# Patient Record
Sex: FEMALE | Race: WHITE | NOT HISPANIC OR LATINO | Employment: OTHER | URBAN - METROPOLITAN AREA
[De-identification: names, ages, dates, MRNs, and addresses within clinical notes are randomized per-mention and may not be internally consistent; named-entity substitution may affect disease eponyms.]

---

## 2023-10-01 LAB — URINE CULTURE: NORMAL

## 2023-11-20 ENCOUNTER — PROCEDURE VISIT (OUTPATIENT)
Dept: PODIATRY | Facility: CLINIC | Age: 63
End: 2023-11-20
Payer: COMMERCIAL

## 2023-11-20 DIAGNOSIS — M21.371 RIGHT FOOT DROP: Primary | ICD-10-CM

## 2023-11-20 DIAGNOSIS — G80.9 CEREBRAL PALSY, UNSPECIFIED TYPE (MULTI): ICD-10-CM

## 2023-11-20 DIAGNOSIS — B35.1 ONYCHOMYCOSIS: ICD-10-CM

## 2023-11-20 DIAGNOSIS — M79.674 PAIN IN TOES OF BOTH FEET: ICD-10-CM

## 2023-11-20 DIAGNOSIS — M79.675 PAIN IN TOES OF BOTH FEET: ICD-10-CM

## 2023-11-20 PROCEDURE — 99203 OFFICE O/P NEW LOW 30 MIN: CPT | Performed by: PODIATRIST

## 2023-11-20 RX ORDER — CA/D3/MAG OX/ZINC/COP/MANG/BOR 600 MG-800
1 TABLET,CHEWABLE ORAL DAILY
COMMUNITY

## 2023-11-20 RX ORDER — SERTRALINE HYDROCHLORIDE 100 MG/1
100 TABLET, FILM COATED ORAL DAILY
COMMUNITY
Start: 2020-02-07

## 2023-11-20 RX ORDER — ETANERCEPT 50 MG/ML
50 SOLUTION SUBCUTANEOUS SEE ADMIN INSTRUCTIONS
COMMUNITY
Start: 2015-02-09

## 2023-11-20 RX ORDER — CLONAZEPAM 0.5 MG/1
0.5 TABLET ORAL DAILY
COMMUNITY
Start: 2018-12-03

## 2023-11-20 RX ORDER — ROSUVASTATIN CALCIUM 5 MG/1
5 TABLET, COATED ORAL DAILY
COMMUNITY
Start: 2023-06-30

## 2023-11-20 RX ORDER — ACETAMINOPHEN 500 MG
500 TABLET ORAL EVERY 8 HOURS PRN
COMMUNITY
Start: 2021-06-04

## 2023-11-20 RX ORDER — LOSARTAN POTASSIUM 50 MG/1
50 TABLET ORAL
COMMUNITY
End: 2024-05-06 | Stop reason: WASHOUT

## 2023-11-20 RX ORDER — TRAZODONE HYDROCHLORIDE 50 MG/1
50 TABLET ORAL NIGHTLY
COMMUNITY
Start: 2018-12-18

## 2023-11-20 RX ORDER — GABAPENTIN 100 MG/1
100 CAPSULE ORAL SEE ADMIN INSTRUCTIONS
COMMUNITY
Start: 2019-06-04

## 2023-11-20 NOTE — PROGRESS NOTES
History Of Present Illness  Enid Sheffield is a 63 y.o. female presenting for transition of care. Patient complains with painful elongated nails.  Patient also Of Her Right Lower Extremity and Does Not Feel like She Has Strength in her right leg.  She Feels like It Gives out on Her.     Past Medical History  She has a past medical history of Encounter for immunization (03/31/2017), Encounter for screening for malignant neoplasm of cervix (06/11/2016), Generalized hyperhidrosis (08/10/2015), Impacted cerumen, bilateral (03/02/2022), Laceration without foreign body of unspecified eyelid and periocular area, initial encounter (06/11/2016), Personal history of other diseases of the circulatory system, Personal history of other diseases of the digestive system, Personal history of other diseases of the respiratory system (10/03/2014), and Personal history of other diseases of urinary system.    Surgical History  She has a past surgical history that includes Lumbar discectomy (09/06/2013); Cervical fusion (09/06/2013); Other surgical history (01/20/2021); and Other surgical history (03/31/2014).     Social History  She has no history on file for tobacco use, alcohol use, and drug use.    Family History  No family history on file.     Allergies  Penicillins and Sulfa (sulfonamide antibiotics)    Medications  Current Outpatient Medications   Medication Sig Dispense Refill    acetaminophen (Tylenol Extra Strength) 500 mg tablet 1 tablet (500 mg) every 8 hours if needed.      Ca-D3-mag-zinc--lisandra-boron (Caltrate 600-D Plus Minerals) 600 mg calcium- 800 unit-40 mg tablet,chewable Chew 1 tablet once daily.      clonazePAM (KlonoPIN) 0.5 mg tablet Take 1 tablet (0.5 mg) by mouth once daily.      EnbreL SureClick 50 mg/mL (1 mL) pen injector pen injection Inject 1 mL (50 mg) under the skin see administration instructions. Every 10 days      gabapentin (Neurontin) 100 mg capsule Take 1 capsule (100 mg) by mouth see  administration instructions. As needed      losartan (Cozaar) 50 mg tablet Take 1 tablet (50 mg) by mouth once daily.      rosuvastatin (Crestor) 5 mg tablet Take 1 tablet (5 mg) by mouth once daily.      sertraline (Zoloft) 100 mg tablet Take 1 tablet (100 mg) by mouth once daily.      SODIUM BORATE, BULK, MISC Take 1 tablet by mouth 2 times a day.      traZODone (Desyrel) 50 mg tablet Take 1 tablet (50 mg) by mouth once daily at bedtime.       No current facility-administered medications for this visit.       Review of Systems    REVIEW OF SYSTEMS  GENERAL:  Negative for malaise, significant weight loss, fever  CARDIOVASCULAR: leg swelling   MUSCULOSKELETAL:  Negative for joint pain or swelling, back pain, and muscle pain.  SKIN:  Negative for lesions, rash, and itching  PSYCH:  Negative for sleep disturbance, mood disorder and recent psychosocial stressors  NEURO: Negative, denies any burning, tingling or numbness     Objective:   Vasc: DP and PT pulses are palpable bilateral.  CFT is less than 3 seconds bilateral.  Skin temperature is warm to cool proximal to distal bilateral.      Neuro:  Light touch is intact to the foot bilateral.      Derm: Nails 1-5 bilateral are thickened, elongated and crumbly with subungual debris. Skin is supple with normal texture and turgor noted.  Webspaces are clean, dry and intact bilateral.  There are no hyperkeratoses, ulcerations, verruca or other lesions noted.      Ortho; patient has weakness of her right lower extremity.  Patient walks with a slight limp.  Patient does not have a propulsive gait on her right foot.    Assessment/Plan     Diagnoses and all orders for this visit:  Right foot drop  Cerebral palsy, unspecified type (CMS/HCC)  Pain in toes of both feet  Onychomycosis      Toenails are debrided in length and thickness to avoid infection and for pain relief  Feel best option for this patient is to get a brace made.  Patient would benefit from an AFO.  This would help  her perform ADLs with more safety and less fear of falling.  She would also have less pain with ambulation.  This would be a lifetime use product.           Felisa Healy, [facility]

## 2024-05-06 ENCOUNTER — OFFICE VISIT (OUTPATIENT)
Dept: PODIATRY | Facility: CLINIC | Age: 64
End: 2024-05-06
Payer: COMMERCIAL

## 2024-05-06 DIAGNOSIS — B35.1 ONYCHOMYCOSIS: ICD-10-CM

## 2024-05-06 DIAGNOSIS — G80.9 CEREBRAL PALSY, UNSPECIFIED TYPE (MULTI): ICD-10-CM

## 2024-05-06 DIAGNOSIS — M79.674 PAIN IN TOES OF BOTH FEET: Primary | ICD-10-CM

## 2024-05-06 DIAGNOSIS — M79.675 PAIN IN TOES OF BOTH FEET: Primary | ICD-10-CM

## 2024-05-06 DIAGNOSIS — M21.371 RIGHT FOOT DROP: ICD-10-CM

## 2024-05-06 PROCEDURE — 99213 OFFICE O/P EST LOW 20 MIN: CPT | Performed by: PODIATRIST

## 2024-05-06 RX ORDER — DOXYCYCLINE 100 MG/1
100 CAPSULE ORAL 2 TIMES DAILY
Qty: 6 CAPSULE | Refills: 0 | Status: SHIPPED | OUTPATIENT
Start: 2024-05-06 | End: 2024-05-09

## 2024-05-06 NOTE — PROGRESS NOTES
History Of Present Illness  Enid Sheffield is a 63 y.o. female presenting with chief complaint of: deformed toenail of left 4th & 5th toenails. PCP Dr Collazo, last visit 5/2024  Patient would like permanent nail avulsion of toes 4 and 5 left foot.  She is finding very painful.  She has had this done on her right foot.  She states with the AFO she has not fallen since she received the device.  Past Medical History  She has a past medical history of Encounter for immunization (03/31/2017), Encounter for screening for malignant neoplasm of cervix (06/11/2016), Generalized hyperhidrosis (08/10/2015), Impacted cerumen, bilateral (03/02/2022), Laceration without foreign body of unspecified eyelid and periocular area, initial encounter (06/11/2016), Personal history of other diseases of the circulatory system, Personal history of other diseases of the digestive system, Personal history of other diseases of the respiratory system (10/03/2014), and Personal history of other diseases of urinary system.    Surgical History  She has a past surgical history that includes Lumbar discectomy (09/06/2013); Cervical fusion (09/06/2013); Other surgical history (01/20/2021); and Other surgical history (03/31/2014).     Social History  She has no history on file for tobacco use, alcohol use, and drug use.    Family History  No family history on file.     Allergies  Penicillins and Sulfa (sulfonamide antibiotics)    Medications  Current Outpatient Medications   Medication Sig Dispense Refill    acetaminophen (Tylenol Extra Strength) 500 mg tablet 1 tablet (500 mg) every 8 hours if needed.      Ca-D3-mag-zinc--lisandra-boron (Caltrate 600-D Plus Minerals) 600 mg calcium- 800 unit-40 mg tablet,chewable Chew 1 tablet once daily.      clonazePAM (KlonoPIN) 0.5 mg tablet Take 1 tablet (0.5 mg) by mouth once daily.      EnbreL SureClick 50 mg/mL (1 mL) pen injector pen injection Inject 1 mL (50 mg) under the skin see administration  instructions. Every 10 days      gabapentin (Neurontin) 100 mg capsule Take 1 capsule (100 mg) by mouth see administration instructions. As needed      rosuvastatin (Crestor) 5 mg tablet Take 1 tablet (5 mg) by mouth once daily.      sertraline (Zoloft) 100 mg tablet Take 1 tablet (100 mg) by mouth once daily.      SODIUM BORATE, BULK, MISC Take 1 tablet by mouth 2 times a day.      traZODone (Desyrel) 50 mg tablet Take 1 tablet (50 mg) by mouth once daily at bedtime.      losartan (Cozaar) 50 mg tablet Take 1 tablet (50 mg) by mouth once daily.       No current facility-administered medications for this visit.       Review of Systems    REVIEW OF SYSTEMS  GENERAL:  Negative for malaise, significant weight loss, fever  CARDIOVASCULAR: leg swelling   MUSCULOSKELETAL:  Negative for joint pain or swelling, back pain, and muscle pain.  SKIN:  Negative for lesions, rash, and itching  PSYCH:  Negative for sleep disturbance, mood disorder and recent psychosocial stressors  NEURO: Negative, denies any burning, tingling or numbness     Objective:   Vasc: DP and PT pulses are palpable bilateral.  CFT is less than 3 seconds bilateral.  Skin temperature is warm to cool proximal to distal bilateral.      Neuro:  Light touch is intact to the foot bilateral. .  The hallux is downgoing bilateral.      Derm patient's nails are thickened elongated and crumbly on the left toes 4 and 5  Skin is supple with normal texture and turgor noted.  Webspaces are clean, dry and intact bilateral.  There are no hyperkeratoses, ulcerations, verruca or other lesions noted.      Ortho: Patient has spasm secondary to CP.  She has foot drop on the right.  Assessment/Plan     Diagnoses and all orders for this visit:  Pain in toes of both feet  Onychomycosis  Right foot drop  Cerebral palsy, unspecified type (Multi)      Patient can continue to wear AFO.  This should help prevent falling.  We did discuss permanent removal of toenails 4 and 5 on the left  foot.  Benefits and risks of surgery were explained to patient prophylactic antibiotics have been called in.           Felisa Healy, CMA

## 2024-05-23 ENCOUNTER — APPOINTMENT (OUTPATIENT)
Dept: PODIATRY | Facility: CLINIC | Age: 64
End: 2024-05-23
Payer: COMMERCIAL

## 2024-05-30 ENCOUNTER — TELEPHONE (OUTPATIENT)
Dept: PODIATRY | Facility: CLINIC | Age: 64
End: 2024-05-30
Payer: COMMERCIAL

## 2024-05-30 DIAGNOSIS — L60.0 INGROWN TOENAIL: Primary | ICD-10-CM

## 2024-05-30 RX ORDER — DOXYCYCLINE 100 MG/1
100 CAPSULE ORAL 2 TIMES DAILY
Qty: 10 CAPSULE | Refills: 0 | Status: SHIPPED | OUTPATIENT
Start: 2024-05-30 | End: 2024-06-04

## 2024-05-30 NOTE — TELEPHONE ENCOUNTER
Enid asked that you send the antibiotic in again for her procedure on Monday.  She missed the  the last time it was sent in for her.

## 2024-06-03 ENCOUNTER — PROCEDURE VISIT (OUTPATIENT)
Dept: PODIATRY | Facility: CLINIC | Age: 64
End: 2024-06-03
Payer: COMMERCIAL

## 2024-06-03 DIAGNOSIS — L60.0 INGROWN TOENAIL: ICD-10-CM

## 2024-06-03 DIAGNOSIS — M79.674 PAIN IN TOES OF BOTH FEET: Primary | ICD-10-CM

## 2024-06-03 DIAGNOSIS — M79.675 PAIN IN TOES OF BOTH FEET: Primary | ICD-10-CM

## 2024-06-03 DIAGNOSIS — M21.371 RIGHT FOOT DROP: ICD-10-CM

## 2024-06-03 DIAGNOSIS — B35.1 ONYCHOMYCOSIS: ICD-10-CM

## 2024-06-03 DIAGNOSIS — G80.9 CEREBRAL PALSY, UNSPECIFIED TYPE (MULTI): ICD-10-CM

## 2024-06-03 PROCEDURE — 11750 EXCISION NAIL&NAIL MATRIX: CPT | Performed by: PODIATRIST

## 2024-06-03 NOTE — PROGRESS NOTES
History Of Present Illness  Enid Sheffield is a 63 y.o. female presenting with chief complaint of: deformed toenail of left 4th & 5th toenails.   PCP Dr Collazo, last visit 5/2024  Patient would like permanent nail avulsion of toes 4 and 5 left foot.  She is finding very painful.  She has had this done on her right foot.  She states with the AFO she has not fallen since she received the device.  Past Medical History  She has a past medical history of Encounter for immunization (03/31/2017), Encounter for screening for malignant neoplasm of cervix (06/11/2016), Generalized hyperhidrosis (08/10/2015), Impacted cerumen, bilateral (03/02/2022), Laceration without foreign body of unspecified eyelid and periocular area, initial encounter (06/11/2016), Personal history of other diseases of the circulatory system, Personal history of other diseases of the digestive system, Personal history of other diseases of the respiratory system (10/03/2014), and Personal history of other diseases of urinary system.    Surgical History  She has a past surgical history that includes Lumbar discectomy (09/06/2013); Cervical fusion (09/06/2013); Other surgical history (01/20/2021); and Other surgical history (03/31/2014).     Social History  She has no history on file for tobacco use, alcohol use, and drug use.    Family History  No family history on file.     Allergies  Penicillins and Sulfa (sulfonamide antibiotics)    Medications  Current Outpatient Medications   Medication Sig Dispense Refill    acetaminophen (Tylenol Extra Strength) 500 mg tablet 1 tablet (500 mg) every 8 hours if needed.      Ca-D3-mag-zinc--lisandra-boron (Caltrate 600-D Plus Minerals) 600 mg calcium- 800 unit-40 mg tablet,chewable Chew 1 tablet once daily.      clonazePAM (KlonoPIN) 0.5 mg tablet Take 1 tablet (0.5 mg) by mouth once daily.      doxycycline (Vibramycin) 100 mg capsule Take 1 capsule (100 mg) by mouth 2 times a day for 5 days. Take with at least 8  ounces (large glass) of water, do not lie down for 30 minutes after 10 capsule 0    EnbreL SureClick 50 mg/mL (1 mL) pen injector pen injection Inject 1 mL (50 mg) under the skin see administration instructions. Every 10 days      gabapentin (Neurontin) 100 mg capsule Take 1 capsule (100 mg) by mouth see administration instructions. As needed      rosuvastatin (Crestor) 5 mg tablet Take 1 tablet (5 mg) by mouth once daily.      sertraline (Zoloft) 100 mg tablet Take 1 tablet (100 mg) by mouth once daily.      SODIUM BORATE, BULK, MISC Take 1 tablet by mouth 2 times a day.      traZODone (Desyrel) 50 mg tablet Take 1 tablet (50 mg) by mouth once daily at bedtime.       No current facility-administered medications for this visit.       Review of Systems    REVIEW OF SYSTEMS  GENERAL:  Negative for malaise, significant weight loss, fever  CARDIOVASCULAR: leg swelling   MUSCULOSKELETAL:  Negative for joint pain or swelling, back pain, and muscle pain.  SKIN:  Negative for lesions, rash, and itching  PSYCH:  Negative for sleep disturbance, mood disorder and recent psychosocial stressors  NEURO: Negative, denies any burning, tingling or numbness     Objective:   Vasc: DP and PT pulses are palpable bilateral.  CFT is less than 3 seconds bilateral.  Skin temperature is warm to cool proximal to distal bilateral.      Neuro:  Light touch is intact to the foot bilateral. .  The hallux is downgoing bilateral.      Derm patient's nails are thickened elongated and crumbly on the left toes 4 and 5  Skin is supple with normal texture and turgor noted.  Webspaces are clean, dry and intact bilateral.  There are no hyperkeratoses, ulcerations, verruca or other lesions noted.      Ortho: Patient has spasm secondary to CP.  She has foot drop on the right.  Assessment/Plan Ingrown toenails 4/5 left foot with fungus    Patient has consented to a permanent partial nail avulsion both verbally and written. Patient understands that the  toenail may grow back and could appear discolored, thickened, or with a fungal infection. Patient appears to understand and is in agreement with the plan.  All questions were answered to the patient's satisfaction with no guarantees given or implied.      Nail Removal Informed Consent included the following and was signed by patient or patients guardian:   I understand that this procedure involves injection of local anesthesia, and I have no known allergies to local anesthetics.  My physician and I have discussed risks, benefits, and potential complications of this procedure.  Risks include, but are not limited to: persistent bleeding, pain, prolonged healing, infection, allergic reaction, swelling, numbness/tingling, and recurrence.  Alternative treatment options were also discussed.  All questions have been answered to my satisfaction and no guarantees regarding the outcome of the procedure have been given or implied.  Aftercare requirements have been discussed and I intend to comply with recommendation.      Procedure: Total Permanent Nail Avulsion -  Left 4 and 5 Toe    The digit was anesthetized with 3cc of 2% lidocaine plain utilizing a digit block. The area was sterily prepped and draped. The toe was cleansed with betadine. Digit tourniqauet applied. A spatula was used to separate the nail plate from the nail bed. An english anvil was used to separate the nail plate in a longitudinal fashion. The nail border was removed with hemostats. A curette was used to ensure no spicules remained.     Three separate applications of phenol were applied to the matrix cells for 30 seconds each. It was ensured that the skin was protected from the chemical. The area was rinsed thoroughly with alcohol to neutralize the acid.     Digit tourniquet removed. A curette was used again to ensure no spicules remained.  Appropriate capillary refill time was confirmed.  The toe was then dressed with antibiotic ointment and a dry sterile  dressing.  Patient was given extensive verbal and written homegoing instructions.      Homegoing instructions dispensed included:   The injection that you received prior to the procedure will have local anesthetic effects for the next 3-5 hours.  Following this, you may notice that the toe is sore with pressure.  Therefore, during healing, attempt to wear an open-toe or wider-toebox shoe.  Leave the bandage on your toe for the next 24 hours if possible.  Tomorrow, begin soaking the affected foot in a warm water bath, with or without Epsom salts.  Make sure to dry the toe completely before applying a dressing.   Change bandage on a daily basis beginning tomorrow following soaks.  Apply a small amount of topical antibiotic ointment with each dressing change.    Keep bandages on while you shower, and change bandage once you dry off.  Keep bandages on when you are wearing shoes and socks.  If a scab forms at the nail removal site, leave it in place.  Continue with daily soaking and bandage changes until you return in two weeks for your follow up appointment.  Call your physician immediately if you notice any increased redness, warmth, milky-appearing discharge, or other signs of infection at the nail removal site.  Call your physician immediately if you experience significant bleeding at the nail removal site.    Patient was advised to call the office or emergency advice line should they experience any problems, changes or worsening of symptoms, or have any questions. Alternatively, the patient was advised to go to the ED.    Pt instructed to follow up in 2-3 weeks or sooner if necessary.

## 2024-06-27 ENCOUNTER — OFFICE VISIT (OUTPATIENT)
Dept: PODIATRY | Facility: CLINIC | Age: 64
End: 2024-06-27
Payer: COMMERCIAL

## 2024-06-27 DIAGNOSIS — L03.032 CELLULITIS AND ABSCESS OF TOE OF LEFT FOOT: ICD-10-CM

## 2024-06-27 DIAGNOSIS — L02.612 CELLULITIS AND ABSCESS OF TOE OF LEFT FOOT: ICD-10-CM

## 2024-06-27 DIAGNOSIS — M79.675 PAIN IN TOES OF BOTH FEET: Primary | ICD-10-CM

## 2024-06-27 DIAGNOSIS — M79.674 PAIN IN TOES OF BOTH FEET: Primary | ICD-10-CM

## 2024-06-27 PROCEDURE — 87205 SMEAR GRAM STAIN: CPT

## 2024-06-27 PROCEDURE — 87070 CULTURE OTHR SPECIMN AEROBIC: CPT

## 2024-06-27 PROCEDURE — 87075 CULTR BACTERIA EXCEPT BLOOD: CPT

## 2024-06-27 PROCEDURE — 99213 OFFICE O/P EST LOW 20 MIN: CPT | Performed by: PODIATRIST

## 2024-06-27 PROCEDURE — 87186 SC STD MICRODIL/AGAR DIL: CPT

## 2024-06-27 RX ORDER — DOXYCYCLINE 100 MG/1
100 CAPSULE ORAL 2 TIMES DAILY
Qty: 14 CAPSULE | Refills: 0 | Status: SHIPPED | OUTPATIENT
Start: 2024-06-27 | End: 2024-07-04

## 2024-06-27 NOTE — PROGRESS NOTES
History Of Present Illness  Enid Sheffield is a 63 y.o. female presenting with chief complaint of: follow up from nail avulsion 6/3/24. Patient complains of pain with ambulation deformed toenail of left 4th & 5th toenails.   PCP Dr Collazo, last visit 5/2024  Patient would like permanent nail avulsion of toes 4 and 5 left foot.  She is finding very painful.  She has had this done on her right foot.  She states with the AFO she has not fallen since she received the device.  6/27: Patient presents with burning pain in her feet.  Most of her pain is located in her fourth toe.  Past Medical History  She has a past medical history of Encounter for immunization (03/31/2017), Encounter for screening for malignant neoplasm of cervix (06/11/2016), Generalized hyperhidrosis (08/10/2015), Impacted cerumen, bilateral (03/02/2022), Laceration without foreign body of unspecified eyelid and periocular area, initial encounter (06/11/2016), Personal history of other diseases of the circulatory system, Personal history of other diseases of the digestive system, Personal history of other diseases of the respiratory system (10/03/2014), and Personal history of other diseases of urinary system.    Surgical History  She has a past surgical history that includes Lumbar discectomy (09/06/2013); Cervical fusion (09/06/2013); Other surgical history (01/20/2021); and Other surgical history (03/31/2014).     Social History  She has no history on file for tobacco use, alcohol use, and drug use.    Family History  No family history on file.     Allergies  Penicillins and Sulfa (sulfonamide antibiotics)    Medications  Current Outpatient Medications   Medication Sig Dispense Refill    acetaminophen (Tylenol Extra Strength) 500 mg tablet 1 tablet (500 mg) every 8 hours if needed.      Ca-D3-mag-zinc--lisandra-boron (Caltrate 600-D Plus Minerals) 600 mg calcium- 800 unit-40 mg tablet,chewable Chew 1 tablet once daily.      clonazePAM (KlonoPIN) 0.5  mg tablet Take 1 tablet (0.5 mg) by mouth once daily.      EnbreL SureClick 50 mg/mL (1 mL) pen injector pen injection Inject 1 mL (50 mg) under the skin see administration instructions. Every 10 days      gabapentin (Neurontin) 100 mg capsule Take 1 capsule (100 mg) by mouth see administration instructions. As needed      rosuvastatin (Crestor) 5 mg tablet Take 1 tablet (5 mg) by mouth once daily.      sertraline (Zoloft) 100 mg tablet Take 1 tablet (100 mg) by mouth once daily.      SODIUM BORATE, BULK, MISC Take 1 tablet by mouth 2 times a day.      traZODone (Desyrel) 50 mg tablet Take 1 tablet (50 mg) by mouth once daily at bedtime.       No current facility-administered medications for this visit.       Review of Systems    REVIEW OF SYSTEMS  GENERAL:  Negative for malaise, significant weight loss, fever  CARDIOVASCULAR: leg swelling   MUSCULOSKELETAL:  Negative for joint pain or swelling, back pain, and muscle pain.  SKIN:  Negative for lesions, rash, and itching  PSYCH:  Negative for sleep disturbance, mood disorder and recent psychosocial stressors  NEURO: Negative, denies any burning, tingling or numbness     Objective:   Vasc: DP and PT pulses are palpable bilateral.  CFT is less than 3 seconds bilateral.  Skin temperature is warm to cool proximal to distal bilateral.      Neuro:  Light touch is intact to the foot bilateral. .  The hallux is downgoing bilateral.      Derm patient is status post removal of nails 4 and 5 left foot.  Patient has erythema and edema on the fourth toe.  She has some blister formation on the fifth toe.  Fourth toe is very tender to touch and slightly warm   ortho: Patient has spasm secondary to CP.  She has foot drop on the right.  Assessment/Plan Ingrown toenails 4/5 left foot with cellulitis of the fourth toe  Toe was cleansed.  Deep culture is done.  Patient started on Keflex.  Patient is to keep antibiotic ointment and a dry dressing on the toe.

## 2024-06-30 LAB
BACTERIA SPEC CULT: ABNORMAL
GRAM STN SPEC: ABNORMAL
GRAM STN SPEC: ABNORMAL

## 2024-07-23 ENCOUNTER — APPOINTMENT (OUTPATIENT)
Dept: PODIATRY | Facility: CLINIC | Age: 64
End: 2024-07-23
Payer: COMMERCIAL

## 2024-08-26 ENCOUNTER — APPOINTMENT (OUTPATIENT)
Dept: PODIATRY | Facility: CLINIC | Age: 64
End: 2024-08-26
Payer: COMMERCIAL

## 2024-08-26 DIAGNOSIS — L60.0 INGROWN TOENAIL: Primary | ICD-10-CM

## 2024-08-26 DIAGNOSIS — B35.1 ONYCHOMYCOSIS: ICD-10-CM

## 2024-08-26 DIAGNOSIS — M79.675 PAIN OF TOE OF LEFT FOOT: ICD-10-CM

## 2024-08-26 PROCEDURE — 11720 DEBRIDE NAIL 1-5: CPT | Performed by: PODIATRIST

## 2024-08-26 NOTE — PROGRESS NOTES
History Of Present Illness  Enid Sheffield is a 63 y.o. female presenting with chief complaint of: follow up from nail avulsion 6/3/24. Patient complains of pain with ambulation deformed toenail of left 4th & 5th toenails.        PCP Dr Collazo, last visit 5/2024    Past Medical History  She has a past medical history of Encounter for immunization (03/31/2017), Encounter for screening for malignant neoplasm of cervix (06/11/2016), Generalized hyperhidrosis (08/10/2015), Impacted cerumen, bilateral (03/02/2022), Laceration without foreign body of unspecified eyelid and periocular area, initial encounter (06/11/2016), Personal history of other diseases of the circulatory system, Personal history of other diseases of the digestive system, Personal history of other diseases of the respiratory system (10/03/2014), and Personal history of other diseases of urinary system.    Surgical History  She has a past surgical history that includes Lumbar discectomy (09/06/2013); Cervical fusion (09/06/2013); Other surgical history (01/20/2021); and Other surgical history (03/31/2014).     Social History  She has no history on file for tobacco use, alcohol use, and drug use.    Family History  No family history on file.     Allergies  Penicillins and Sulfa (sulfonamide antibiotics)    Medications  Current Outpatient Medications   Medication Sig Dispense Refill    acetaminophen (Tylenol Extra Strength) 500 mg tablet 1 tablet (500 mg) every 8 hours if needed.      Ca-D3-mag-zinc--lisandra-boron (Caltrate 600-D Plus Minerals) 600 mg calcium- 800 unit-40 mg tablet,chewable Chew 1 tablet once daily.      clonazePAM (KlonoPIN) 0.5 mg tablet Take 1 tablet (0.5 mg) by mouth once daily.      EnbreL SureClick 50 mg/mL (1 mL) pen injector pen injection Inject 1 mL (50 mg) under the skin see administration instructions. Every 10 days      gabapentin (Neurontin) 100 mg capsule Take 1 capsule (100 mg) by mouth see administration instructions. As  needed      rosuvastatin (Crestor) 5 mg tablet Take 1 tablet (5 mg) by mouth once daily.      sertraline (Zoloft) 100 mg tablet Take 1 tablet (100 mg) by mouth once daily.      SODIUM BORATE, BULK, MISC Take 1 tablet by mouth 2 times a day.      traZODone (Desyrel) 50 mg tablet Take 1 tablet (50 mg) by mouth once daily at bedtime.       No current facility-administered medications for this visit.       Review of Systems    REVIEW OF SYSTEMS  GENERAL:  Negative for malaise, significant weight loss, fever  CARDIOVASCULAR: leg swelling   MUSCULOSKELETAL:  Negative for joint pain or swelling, back pain, and muscle pain.  SKIN:  Negative for lesions, rash, and itching  PSYCH:  Negative for sleep disturbance, mood disorder and recent psychosocial stressors  NEURO: Negative, denies any burning, tingling or numbness     Objective:   Vasc: DP and PT pulses are palpable bilateral.  CFT is less than 3 seconds bilateral.  Skin temperature is warm to cool proximal to distal bilateral.      Neuro:  Light touch is intact to the foot bilateral. .  The hallux is downgoing bilateral.      Derm patient is status post removal of nails 4 and 5 left foot Unfortunately, patient does have regrowth of the nails  ortho: Patient has spasm secondary to CP.  She has foot drop on the right.  Assessment/Plan Ingrown toenails 4/5 left foot -patient does have regrowth of nails.  At this time nails are debrided in length and thickness to avoid infection for pain relief.  Would not recommend second avulsion at this time.  I question if we would need to do complete sedation in order to spasms during toenail avulsion procedure.

## 2024-09-17 ENCOUNTER — APPOINTMENT (OUTPATIENT)
Dept: PRIMARY CARE | Facility: CLINIC | Age: 64
End: 2024-09-17
Payer: COMMERCIAL

## 2024-09-24 ENCOUNTER — OFFICE VISIT (OUTPATIENT)
Dept: URGENT CARE | Age: 64
End: 2024-09-24
Payer: COMMERCIAL

## 2024-09-24 ENCOUNTER — TELEPHONE (OUTPATIENT)
Dept: PRIMARY CARE | Facility: CLINIC | Age: 64
End: 2024-09-24
Payer: COMMERCIAL

## 2024-09-24 VITALS
HEIGHT: 67 IN | OXYGEN SATURATION: 96 % | SYSTOLIC BLOOD PRESSURE: 137 MMHG | HEART RATE: 61 BPM | RESPIRATION RATE: 22 BRPM | WEIGHT: 175 LBS | DIASTOLIC BLOOD PRESSURE: 84 MMHG | TEMPERATURE: 98.1 F | BODY MASS INDEX: 27.47 KG/M2

## 2024-09-24 DIAGNOSIS — S70.02XA CONTUSION OF LEFT HIP, INITIAL ENCOUNTER: ICD-10-CM

## 2024-09-24 DIAGNOSIS — S51.012A ELBOW LACERATION, LEFT, INITIAL ENCOUNTER: Primary | ICD-10-CM

## 2024-09-24 ASSESSMENT — PATIENT HEALTH QUESTIONNAIRE - PHQ9
2. FEELING DOWN, DEPRESSED OR HOPELESS: NOT AT ALL
1. LITTLE INTEREST OR PLEASURE IN DOING THINGS: NOT AT ALL
SUM OF ALL RESPONSES TO PHQ9 QUESTIONS 1 AND 2: 0

## 2024-09-24 NOTE — PROGRESS NOTES
"Subjective   Patient ID: Enid Sheffield is a 63 y.o. female. They present today with a chief complaint of Injury (Fell last night and cut elbow x1day).    History of Present Illness  Enid is a left-hand-dominant 63-year-old female with underlying baseline neurologic deficit who presents to the urgent care for evaluation of left elbow injury with wound after sustaining a fall yesterday night at around 11 PM.  The patient reports injury/fall occurred at around 11 pm last night 9/23/24 however denies head trauma or loss of consciousness.  Patient's more concerned with wound and would like this evaluated and treated.  Patient denies weakness, wrist drop or difficulty moving the arm or elbow.  Patient denies any wrist pain.  Patient has concerns given this is her dominant arm and would like reassurance about moving it freely.  Patient also reports she landed on her left outer hip and has some tenderness there with bruising.  Patient denies difficulty ambulating, limping or concern for fracture.  The patient is therefore declining any X-rays or imaging to hip or elbow and is seeking reassurance only and \"does not feel anything is fractured or broken\".     Past Medical History  Allergies as of 09/24/2024 - Reviewed 09/24/2024   Allergen Reaction Noted    Penicillins Rash 06/14/2000    Sulfa (sulfonamide antibiotics) Rash 11/20/2023       (Not in a hospital admission)       Past Medical History:   Diagnosis Date    Encounter for immunization 03/31/2017    Need for influenza vaccination    Encounter for screening for malignant neoplasm of cervix 06/11/2016    Screening for malignant neoplasm of cervix    Generalized hyperhidrosis 08/10/2015    Diaphoresis    Impacted cerumen, bilateral 03/02/2022    Bilateral impacted cerumen    Laceration without foreign body of unspecified eyelid and periocular area, initial encounter 06/11/2016    Eyebrow laceration    Personal history of other diseases of the circulatory system     " "History of hypertension    Personal history of other diseases of the digestive system     History of hepatic disease    Personal history of other diseases of the respiratory system 10/03/2014    History of acute bronchitis    Personal history of other diseases of urinary system     History of kidney disease       Past Surgical History:   Procedure Laterality Date    CERVICAL FUSION  09/06/2013    Cervical Vertebral Fusion    LUMBAR DISCECTOMY  09/06/2013    Spinal Diskectomy    OTHER SURGICAL HISTORY  01/20/2021    Colonoscopy    OTHER SURGICAL HISTORY  03/31/2014    Acellular Dermal Replacement Ankles       Review of Systems  A 10-point review of systems was performed, otherwise unremarkable unless stated in the history of present illness.                Objective    Vitals:    09/24/24 1610   BP: 137/84   Pulse: 61   Resp: 22   Temp: 36.7 °C (98.1 °F)   SpO2: 96%   Weight: 79.4 kg (175 lb)   Height: 1.702 m (5' 7\")     No LMP recorded.    Gen: Vitals noted and reviewed, no evidence of acute distress, well developed and afebrile.   Psych: Mood and affect appropriate for setting.  Head/Face: Atraumatic and normocephalic.   Neuro: Baseline neurologic status noted. Cranial nerves grossly intact.  Sensation intact. No focal deficits noted.  Neck: Full ROM.   Cardiac: Regular rate and rhythm no murmur.   Lungs: Clear to auscultation throughout, No evidence of wheezing, rhonchi or crackles. Good aeration throughout.   MSK left elbow: Laceration to elbow over olecranon process stated below.  Full range of motion otherwise.  No signs of joint effusion or bony deformity.  Full range of motion.  Full strength.  Negative instability testing.  MSK left hip: Ecchymotic patch over the greater trochanter without fluctuant mass or open wounds.  Full range of motion of the hip.  Neurovascular intact.  Full strength.  No significant bony tenderness, bony deformity, crepitus or step-off.  Extremities: Symmetrical, No peripheral " edema  Skin: Linear horizontal laceration to left olecranon with scant bleeding, measuring approximately 2 cm in length and involving the epidermal and dermal layers. Without evidence of ecchymosis, or rashes.      Point of Care Test & Imaging Results from this visit  No results found for this visit on 09/24/24.   No results found.    Diagnostic study results (if any) were reviewed by Hank Aguilar DO.    Assessment/Plan   Allergies, medications, history, and pertinent labs/EKGs/Imaging reviewed by Hank Aguilar DO.     Medical Decision Making  Discussed with the patient symptoms and clinical presentation findings are suggestive of a laceration to the elbow which is not amendable to suturing giving greater than 12 hours since time of injury and this increases the risk of infection.  However we did clean the wound and place Steri-Strips over it and advised on infection precautions and to keep the area clean.  We also provided a wound care referral.  We did discuss the importance of radiographic imaging of the elbow and the hip to rule out fracture given the patient's underlying baseline neurologic issues, fall risk and nature of the injury however the patient declined imaging at this time as she is not concerned for any fractures. Follow up with PCP. We advised seeking immediate emergency medical attention if symptoms fail to improve, worsen or any concerning symptoms arise. Patient voiced full understanding and agreement to plan.      Orders and Diagnoses  Diagnoses and all orders for this visit:  Elbow laceration, left, initial encounter  -     Referral to Wound Clinic; Future  Contusion of left hip, initial encounter  Other orders  -     Wound Care      Medical Admin Record      Patient disposition: Home    Electronically signed by Hank Aguilar DO  7:25 PM

## 2024-09-24 NOTE — PATIENT INSTRUCTIONS
Follow up with wound care and PCP. We advised seeking immediate emergency medical attention if symptoms fail to improve, worsen or any concerning symptoms arise. Patient voiced full understanding and agreement to plan.

## 2024-09-26 PROBLEM — A04.72 C. DIFFICILE DIARRHEA: Status: RESOLVED | Noted: 2024-09-26 | Resolved: 2024-09-26

## 2024-09-26 PROBLEM — I10 HTN (HYPERTENSION): Status: ACTIVE | Noted: 2024-09-26

## 2024-09-26 PROBLEM — M51.36 DEGENERATIVE DISC DISEASE, LUMBAR: Status: ACTIVE | Noted: 2024-09-26

## 2024-09-26 PROBLEM — S00.419A ABRASION OF EAR REGION: Status: RESOLVED | Noted: 2024-09-26 | Resolved: 2024-09-26

## 2024-09-26 PROBLEM — H60.60 CHRONIC OTITIS EXTERNA: Status: RESOLVED | Noted: 2024-09-26 | Resolved: 2024-09-26

## 2024-09-26 PROBLEM — S00.419A EAR CANAL ABRASION: Status: RESOLVED | Noted: 2024-09-26 | Resolved: 2024-09-26

## 2024-09-26 PROBLEM — M85.80 OSTEOPENIA: Status: ACTIVE | Noted: 2024-09-26

## 2024-09-26 PROBLEM — M06.9 RHEUMATOID ARTHRITIS: Status: ACTIVE | Noted: 2024-09-26

## 2024-09-26 PROBLEM — M51.369 DEGENERATIVE DISC DISEASE, LUMBAR: Status: ACTIVE | Noted: 2024-09-26

## 2024-09-26 PROBLEM — H61.23 BILATERAL IMPACTED CERUMEN: Status: RESOLVED | Noted: 2024-09-26 | Resolved: 2024-09-26

## 2024-09-26 PROBLEM — N39.0 ACUTE LOWER UTI: Status: RESOLVED | Noted: 2024-09-26 | Resolved: 2024-09-26

## 2024-09-26 PROBLEM — H91.93 BILATERAL HEARING LOSS: Status: ACTIVE | Noted: 2024-09-26

## 2024-09-26 PROBLEM — R51.9 HEADACHE: Status: RESOLVED | Noted: 2024-09-26 | Resolved: 2024-09-26

## 2024-09-26 PROBLEM — K52.9 ACUTE GASTROENTERITIS: Status: RESOLVED | Noted: 2024-09-26 | Resolved: 2024-09-26

## 2024-09-26 PROBLEM — Z98.1 S/P CERVICAL SPINAL FUSION: Status: ACTIVE | Noted: 2023-04-20

## 2024-09-26 PROBLEM — F32.A DEPRESSION: Status: ACTIVE | Noted: 2024-09-26

## 2024-09-26 PROBLEM — R19.7 DIARRHEA: Status: RESOLVED | Noted: 2024-09-26 | Resolved: 2024-09-26

## 2024-09-26 PROBLEM — E22.0 ACROMEGALY (MULTI): Status: ACTIVE | Noted: 2024-09-26

## 2024-09-26 PROBLEM — S52.539A CLOSED COLLES' FRACTURE: Status: RESOLVED | Noted: 2019-04-03 | Resolved: 2024-09-26

## 2024-09-26 PROBLEM — M19.029 OSTEOARTHRITIS OF ELBOW: Status: ACTIVE | Noted: 2024-09-26

## 2024-09-26 PROBLEM — R13.13 PHARYNGEAL DYSPHAGIA: Status: ACTIVE | Noted: 2023-07-28

## 2024-09-26 PROBLEM — M54.12 CERVICAL RADICULOPATHY: Status: ACTIVE | Noted: 2023-04-20

## 2024-09-26 PROBLEM — E80.4 GILBERT SYNDROME: Status: ACTIVE | Noted: 2024-09-26

## 2024-09-26 PROBLEM — N39.0 UTI (URINARY TRACT INFECTION): Status: RESOLVED | Noted: 2023-09-29 | Resolved: 2024-09-26

## 2024-09-26 PROBLEM — S69.91XA HAND INJURY, RIGHT, INITIAL ENCOUNTER: Status: RESOLVED | Noted: 2024-09-26 | Resolved: 2024-09-26

## 2024-09-26 PROBLEM — N39.3 STRESS INCONTINENCE, FEMALE: Status: ACTIVE | Noted: 2024-09-26

## 2024-09-26 PROBLEM — R10.9 FLANK PAIN: Status: RESOLVED | Noted: 2018-05-05 | Resolved: 2024-09-26

## 2024-09-26 PROBLEM — M19.029: Status: ACTIVE | Noted: 2024-09-26

## 2024-09-26 PROBLEM — R31.9 HEMATURIA: Status: RESOLVED | Noted: 2024-09-26 | Resolved: 2024-09-26

## 2024-09-26 PROBLEM — M62.838 MUSCLE SPASTICITY: Status: ACTIVE | Noted: 2024-09-26

## 2024-09-26 PROBLEM — D35.00 PHEOCHROMOCYTOMA: Status: ACTIVE | Noted: 2024-09-26

## 2024-09-26 PROBLEM — G25.2 DYSTONIC TREMOR: Status: ACTIVE | Noted: 2019-11-20

## 2024-09-26 PROBLEM — F90.9: Status: ACTIVE | Noted: 2024-09-26

## 2024-09-26 PROBLEM — R49.0 DYSPHONIA: Status: ACTIVE | Noted: 2024-09-26

## 2024-09-26 PROBLEM — R47.1 DYSARTHRIA: Status: ACTIVE | Noted: 2023-07-28

## 2024-09-26 PROBLEM — G24.9 DYSTONIA: Status: ACTIVE | Noted: 2024-09-26

## 2024-09-26 PROBLEM — S89.90XA KNEE INJURY: Status: RESOLVED | Noted: 2024-09-26 | Resolved: 2024-09-26

## 2024-09-26 PROBLEM — R63.4 UNINTENTIONAL WEIGHT LOSS OF MORE THAN 10 POUNDS: Status: ACTIVE | Noted: 2024-09-26

## 2024-09-26 PROBLEM — R61 HYPERHIDROSIS: Status: ACTIVE | Noted: 2024-09-26

## 2024-09-26 PROBLEM — E78.5 HYPERLIPIDEMIA: Status: ACTIVE | Noted: 2024-09-26

## 2024-09-26 PROBLEM — N20.0 CALCULUS OF KIDNEY: Status: ACTIVE | Noted: 2024-09-26

## 2024-09-26 PROBLEM — R61 EXCESSIVE SWEATING: Status: ACTIVE | Noted: 2024-09-26

## 2024-09-26 PROBLEM — H90.3 ASYMMETRIC SNHL (SENSORINEURAL HEARING LOSS): Status: ACTIVE | Noted: 2024-09-26

## 2024-09-26 PROBLEM — G56.20 ULNAR NERVE PALSY: Status: ACTIVE | Noted: 2024-09-26

## 2024-09-26 PROBLEM — M54.50 LOW BACK PAIN: Status: RESOLVED | Noted: 2024-09-26 | Resolved: 2024-09-26

## 2024-09-26 PROBLEM — S62.354A CLOSED NONDISPLACED FRACTURE OF SHAFT OF FOURTH METACARPAL BONE OF RIGHT HAND: Status: RESOLVED | Noted: 2024-09-26 | Resolved: 2024-09-26

## 2024-09-26 PROBLEM — G47.00 INSOMNIA: Status: ACTIVE | Noted: 2024-09-26

## 2024-09-26 PROBLEM — J44.9 COPD (CHRONIC OBSTRUCTIVE PULMONARY DISEASE) (MULTI): Status: ACTIVE | Noted: 2024-09-26

## 2024-09-26 PROBLEM — R20.2 PINS AND NEEDLES SENSATION: Status: ACTIVE | Noted: 2024-09-26

## 2024-09-26 PROBLEM — E87.6 HYPOKALEMIA: Status: ACTIVE | Noted: 2024-09-26

## 2024-09-26 PROBLEM — J45.909 AB (ASTHMATIC BRONCHITIS) (HHS-HCC): Status: RESOLVED | Noted: 2024-09-26 | Resolved: 2024-09-26

## 2024-09-26 PROBLEM — H60.549 DERMATITIS OF EAR CANAL: Status: RESOLVED | Noted: 2024-09-26 | Resolved: 2024-09-26

## 2024-09-26 PROBLEM — M79.603 ARM PAIN: Status: RESOLVED | Noted: 2024-09-26 | Resolved: 2024-09-26

## 2024-09-26 PROBLEM — M77.12 LATERAL EPICONDYLITIS OF LEFT ELBOW: Status: ACTIVE | Noted: 2024-09-26

## 2024-09-26 PROBLEM — J01.90 ACUTE SINUSITIS: Status: RESOLVED | Noted: 2024-09-26 | Resolved: 2024-09-26

## 2024-09-26 PROBLEM — N28.1 COMPLEX RENAL CYST: Status: ACTIVE | Noted: 2024-09-26

## 2024-09-26 PROBLEM — M79.603 PAIN OF UPPER EXTREMITY: Status: RESOLVED | Noted: 2024-09-26 | Resolved: 2024-09-26

## 2024-09-26 PROBLEM — J20.9 ACUTE BRONCHITIS: Status: RESOLVED | Noted: 2024-09-26 | Resolved: 2024-09-26

## 2024-09-26 RX ORDER — CALCIUM CARBONATE/VITAMIN D3 600 MG-20
1 TABLET,CHEWABLE ORAL DAILY
COMMUNITY
Start: 2016-02-01

## 2024-09-27 ENCOUNTER — OFFICE VISIT (OUTPATIENT)
Dept: PRIMARY CARE | Facility: CLINIC | Age: 64
End: 2024-09-27
Payer: COMMERCIAL

## 2024-09-27 VITALS
DIASTOLIC BLOOD PRESSURE: 60 MMHG | SYSTOLIC BLOOD PRESSURE: 140 MMHG | WEIGHT: 175 LBS | BODY MASS INDEX: 27.47 KG/M2 | HEIGHT: 67 IN | TEMPERATURE: 96.8 F
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
## 2024-09-27 DIAGNOSIS — M25.552 PAIN OF LEFT HIP: ICD-10-CM

## 2024-09-27 DIAGNOSIS — S51.012D LACERATION OF LEFT ELBOW, SUBSEQUENT ENCOUNTER: Primary | ICD-10-CM

## 2024-09-27 PROCEDURE — 3077F SYST BP >= 140 MM HG: CPT | Performed by: FAMILY MEDICINE

## 2024-09-27 PROCEDURE — 3078F DIAST BP <80 MM HG: CPT | Performed by: FAMILY MEDICINE

## 2024-09-27 PROCEDURE — 3008F BODY MASS INDEX DOCD: CPT | Performed by: FAMILY MEDICINE

## 2024-09-27 PROCEDURE — 99212 OFFICE O/P EST SF 10 MIN: CPT | Performed by: FAMILY MEDICINE

## 2024-09-27 NOTE — PROGRESS NOTES
"Chief complaint:   Chief Complaint   Patient presents with    Follow-up     Urgent care 9/24/24 for elbow laceration after fall on 9/23/24, left thigh bruised and painful       HPI:  Enid Sheffield is a 63 y.o. female who presents after a fall to her right side. She was seen in urgent care 9/24/2024 with an elbow laceration though due to timing of the visit, could not be stitched at that time. She has pain and bruising of the left hip though is able to walk and reports it is improving. She hit the left side of her head though denies any current pain.     Physical exam:  /60   Temp 36 °C (96.8 °F)   Ht 1.702 m (5' 7\")   Wt 79.4 kg (175 lb)   BMI 27.41 kg/m²   General: NAD, well appearing female  Skin: left elbow with linear laceration without surrounding erythema and no discharge, left hip with large bruise, left face above eye with yellowed bruising    Assessment/Plan   Problem List Items Addressed This Visit    None  Visit Diagnoses       Laceration of left elbow, subsequent encounter    -  Primary    Pain of left hip        Fall, subsequent encounter            Urgent care note reviewed in EMR  Continue monitoring for healing, keep dressed and clean, follow up if not starting to heal within the week, sooner with any signs of infection  Follow up for the hip pain pending sx  Keep appointment 10/15/2024    Sandie Marin, DO          "

## 2024-10-09 ENCOUNTER — APPOINTMENT (OUTPATIENT)
Dept: RADIOLOGY | Facility: HOSPITAL | Age: 64
End: 2024-10-09
Payer: COMMERCIAL

## 2024-10-09 ENCOUNTER — APPOINTMENT (OUTPATIENT)
Dept: NEUROLOGY | Facility: HOSPITAL | Age: 64
End: 2024-10-09
Payer: COMMERCIAL

## 2024-10-09 ENCOUNTER — HOSPITAL ENCOUNTER (OUTPATIENT)
Facility: HOSPITAL | Age: 64
Setting detail: OBSERVATION
LOS: 1 days | Discharge: PSYCHIATRIC OR SUBSTANCE USE FACILITY | End: 2024-10-11
Attending: STUDENT IN AN ORGANIZED HEALTH CARE EDUCATION/TRAINING PROGRAM | Admitting: STUDENT IN AN ORGANIZED HEALTH CARE EDUCATION/TRAINING PROGRAM
Payer: COMMERCIAL

## 2024-10-09 DIAGNOSIS — R41.82 ALTERED MENTAL STATUS, UNSPECIFIED ALTERED MENTAL STATUS TYPE: Primary | ICD-10-CM

## 2024-10-09 LAB
25(OH)D3 SERPL-MCNC: 44 NG/ML (ref 30–100)
ALBUMIN SERPL BCP-MCNC: 4.2 G/DL (ref 3.4–5)
ALP SERPL-CCNC: 71 U/L (ref 33–136)
ALT SERPL W P-5'-P-CCNC: 16 U/L (ref 7–45)
AMMONIA PLAS-SCNC: 28 UMOL/L (ref 16–53)
ANION GAP SERPL CALC-SCNC: 12 MMOL/L (ref 10–20)
APPEARANCE UR: CLEAR
AST SERPL W P-5'-P-CCNC: 21 U/L (ref 9–39)
BASOPHILS # BLD AUTO: 0.04 X10*3/UL (ref 0–0.1)
BASOPHILS NFR BLD AUTO: 0.5 %
BILIRUB SERPL-MCNC: 0.8 MG/DL (ref 0–1.2)
BILIRUB UR STRIP.AUTO-MCNC: NEGATIVE MG/DL
BUN SERPL-MCNC: 12 MG/DL (ref 6–23)
CALCIUM SERPL-MCNC: 8.8 MG/DL (ref 8.6–10.3)
CARDIAC TROPONIN I PNL SERPL HS: 8 NG/L (ref 0–13)
CARDIAC TROPONIN I PNL SERPL HS: 8 NG/L (ref 0–13)
CHLORIDE SERPL-SCNC: 107 MMOL/L (ref 98–107)
CO2 SERPL-SCNC: 26 MMOL/L (ref 21–32)
COLOR UR: YELLOW
CREAT SERPL-MCNC: 0.83 MG/DL (ref 0.5–1.05)
CRP SERPL-MCNC: 0.1 MG/DL
EGFRCR SERPLBLD CKD-EPI 2021: 79 ML/MIN/1.73M*2
EOSINOPHIL # BLD AUTO: 0.27 X10*3/UL (ref 0–0.7)
EOSINOPHIL NFR BLD AUTO: 3.3 %
ERYTHROCYTE [DISTWIDTH] IN BLOOD BY AUTOMATED COUNT: 11.8 % (ref 11.5–14.5)
ERYTHROCYTE [SEDIMENTATION RATE] IN BLOOD BY WESTERGREN METHOD: 8 MM/H (ref 0–30)
FLUAV RNA RESP QL NAA+PROBE: NOT DETECTED
FLUBV RNA RESP QL NAA+PROBE: NOT DETECTED
FOLATE SERPL-MCNC: 10.4 NG/ML
GLUCOSE SERPL-MCNC: 140 MG/DL (ref 74–99)
GLUCOSE UR STRIP.AUTO-MCNC: NORMAL MG/DL
HCT VFR BLD AUTO: 40.7 % (ref 36–46)
HGB BLD-MCNC: 13.8 G/DL (ref 12–16)
HOLD SPECIMEN: NORMAL
HYALINE CASTS #/AREA URNS AUTO: ABNORMAL /LPF
IMM GRANULOCYTES # BLD AUTO: 0.01 X10*3/UL (ref 0–0.7)
IMM GRANULOCYTES NFR BLD AUTO: 0.1 % (ref 0–0.9)
KETONES UR STRIP.AUTO-MCNC: NEGATIVE MG/DL
LACTATE SERPL-SCNC: 2 MMOL/L (ref 0.4–2)
LEUKOCYTE ESTERASE UR QL STRIP.AUTO: NEGATIVE
LIPASE SERPL-CCNC: 38 U/L (ref 9–82)
LYMPHOCYTES # BLD AUTO: 1.96 X10*3/UL (ref 1.2–4.8)
LYMPHOCYTES NFR BLD AUTO: 24 %
MAGNESIUM SERPL-MCNC: 1.98 MG/DL (ref 1.6–2.4)
MCH RBC QN AUTO: 29.1 PG (ref 26–34)
MCHC RBC AUTO-ENTMCNC: 33.9 G/DL (ref 32–36)
MCV RBC AUTO: 86 FL (ref 80–100)
MONOCYTES # BLD AUTO: 0.6 X10*3/UL (ref 0.1–1)
MONOCYTES NFR BLD AUTO: 7.4 %
MUCOUS THREADS #/AREA URNS AUTO: ABNORMAL /LPF
NEUTROPHILS # BLD AUTO: 5.28 X10*3/UL (ref 1.2–7.7)
NEUTROPHILS NFR BLD AUTO: 64.7 %
NITRITE UR QL STRIP.AUTO: NEGATIVE
NRBC BLD-RTO: 0 /100 WBCS (ref 0–0)
PH UR STRIP.AUTO: 6 [PH]
PLATELET # BLD AUTO: 192 X10*3/UL (ref 150–450)
POTASSIUM SERPL-SCNC: 3.3 MMOL/L (ref 3.5–5.3)
PROT SERPL-MCNC: 6.1 G/DL (ref 6.4–8.2)
PROT UR STRIP.AUTO-MCNC: ABNORMAL MG/DL
RBC # BLD AUTO: 4.74 X10*6/UL (ref 4–5.2)
RBC # UR STRIP.AUTO: ABNORMAL /UL
RBC #/AREA URNS AUTO: ABNORMAL /HPF
SARS-COV-2 RNA RESP QL NAA+PROBE: NOT DETECTED
SODIUM SERPL-SCNC: 142 MMOL/L (ref 136–145)
SP GR UR STRIP.AUTO: 1.02
TSH SERPL-ACNC: 5.24 MIU/L (ref 0.44–3.98)
UROBILINOGEN UR STRIP.AUTO-MCNC: ABNORMAL MG/DL
VIT B12 SERPL-MCNC: 291 PG/ML (ref 211–911)
WBC # BLD AUTO: 8.2 X10*3/UL (ref 4.4–11.3)
WBC #/AREA URNS AUTO: ABNORMAL /HPF
YEAST BUDDING #/AREA UR COMP ASSIST: PRESENT /HPF

## 2024-10-09 PROCEDURE — 80053 COMPREHEN METABOLIC PANEL: CPT | Performed by: STUDENT IN AN ORGANIZED HEALTH CARE EDUCATION/TRAINING PROGRAM

## 2024-10-09 PROCEDURE — 82140 ASSAY OF AMMONIA: CPT

## 2024-10-09 PROCEDURE — 36415 COLL VENOUS BLD VENIPUNCTURE: CPT | Performed by: STUDENT IN AN ORGANIZED HEALTH CARE EDUCATION/TRAINING PROGRAM

## 2024-10-09 PROCEDURE — 83690 ASSAY OF LIPASE: CPT | Performed by: STUDENT IN AN ORGANIZED HEALTH CARE EDUCATION/TRAINING PROGRAM

## 2024-10-09 PROCEDURE — 71045 X-RAY EXAM CHEST 1 VIEW: CPT

## 2024-10-09 PROCEDURE — 96374 THER/PROPH/DIAG INJ IV PUSH: CPT

## 2024-10-09 PROCEDURE — 84075 ASSAY ALKALINE PHOSPHATASE: CPT | Performed by: STUDENT IN AN ORGANIZED HEALTH CARE EDUCATION/TRAINING PROGRAM

## 2024-10-09 PROCEDURE — 2500000004 HC RX 250 GENERAL PHARMACY W/ HCPCS (ALT 636 FOR OP/ED)

## 2024-10-09 PROCEDURE — 96376 TX/PRO/DX INJ SAME DRUG ADON: CPT

## 2024-10-09 PROCEDURE — 70450 CT HEAD/BRAIN W/O DYE: CPT

## 2024-10-09 PROCEDURE — 71045 X-RAY EXAM CHEST 1 VIEW: CPT | Mod: FOREIGN READ | Performed by: RADIOLOGY

## 2024-10-09 PROCEDURE — 82306 VITAMIN D 25 HYDROXY: CPT | Mod: STJLAB

## 2024-10-09 PROCEDURE — 84591 ASSAY OF NOS VITAMIN: CPT | Mod: 59

## 2024-10-09 PROCEDURE — 99285 EMERGENCY DEPT VISIT HI MDM: CPT | Performed by: STUDENT IN AN ORGANIZED HEALTH CARE EDUCATION/TRAINING PROGRAM

## 2024-10-09 PROCEDURE — 99222 1ST HOSP IP/OBS MODERATE 55: CPT | Performed by: PSYCHIATRY & NEUROLOGY

## 2024-10-09 PROCEDURE — 82607 VITAMIN B-12: CPT | Mod: STJLAB

## 2024-10-09 PROCEDURE — 85025 COMPLETE CBC W/AUTO DIFF WBC: CPT | Performed by: STUDENT IN AN ORGANIZED HEALTH CARE EDUCATION/TRAINING PROGRAM

## 2024-10-09 PROCEDURE — 84484 ASSAY OF TROPONIN QUANT: CPT | Performed by: STUDENT IN AN ORGANIZED HEALTH CARE EDUCATION/TRAINING PROGRAM

## 2024-10-09 PROCEDURE — 83605 ASSAY OF LACTIC ACID: CPT | Performed by: STUDENT IN AN ORGANIZED HEALTH CARE EDUCATION/TRAINING PROGRAM

## 2024-10-09 PROCEDURE — 84425 ASSAY OF VITAMIN B-1: CPT

## 2024-10-09 PROCEDURE — 82746 ASSAY OF FOLIC ACID SERUM: CPT | Mod: STJLAB

## 2024-10-09 PROCEDURE — 86255 FLUORESCENT ANTIBODY SCREEN: CPT | Performed by: STUDENT IN AN ORGANIZED HEALTH CARE EDUCATION/TRAINING PROGRAM

## 2024-10-09 PROCEDURE — 99223 1ST HOSP IP/OBS HIGH 75: CPT | Performed by: STUDENT IN AN ORGANIZED HEALTH CARE EDUCATION/TRAINING PROGRAM

## 2024-10-09 PROCEDURE — 86140 C-REACTIVE PROTEIN: CPT | Performed by: STUDENT IN AN ORGANIZED HEALTH CARE EDUCATION/TRAINING PROGRAM

## 2024-10-09 PROCEDURE — 1100000001 HC PRIVATE ROOM DAILY

## 2024-10-09 PROCEDURE — 85652 RBC SED RATE AUTOMATED: CPT | Performed by: STUDENT IN AN ORGANIZED HEALTH CARE EDUCATION/TRAINING PROGRAM

## 2024-10-09 PROCEDURE — 83735 ASSAY OF MAGNESIUM: CPT | Performed by: STUDENT IN AN ORGANIZED HEALTH CARE EDUCATION/TRAINING PROGRAM

## 2024-10-09 PROCEDURE — 2500000002 HC RX 250 W HCPCS SELF ADMINISTERED DRUGS (ALT 637 FOR MEDICARE OP, ALT 636 FOR OP/ED)

## 2024-10-09 PROCEDURE — 87502 INFLUENZA DNA AMP PROBE: CPT | Performed by: STUDENT IN AN ORGANIZED HEALTH CARE EDUCATION/TRAINING PROGRAM

## 2024-10-09 PROCEDURE — 2500000004 HC RX 250 GENERAL PHARMACY W/ HCPCS (ALT 636 FOR OP/ED): Performed by: STUDENT IN AN ORGANIZED HEALTH CARE EDUCATION/TRAINING PROGRAM

## 2024-10-09 PROCEDURE — 81001 URINALYSIS AUTO W/SCOPE: CPT | Performed by: STUDENT IN AN ORGANIZED HEALTH CARE EDUCATION/TRAINING PROGRAM

## 2024-10-09 PROCEDURE — 70450 CT HEAD/BRAIN W/O DYE: CPT | Performed by: STUDENT IN AN ORGANIZED HEALTH CARE EDUCATION/TRAINING PROGRAM

## 2024-10-09 PROCEDURE — 99285 EMERGENCY DEPT VISIT HI MDM: CPT

## 2024-10-09 PROCEDURE — 84443 ASSAY THYROID STIM HORMONE: CPT

## 2024-10-09 RX ORDER — LORAZEPAM 2 MG/ML
1 INJECTION INTRAMUSCULAR ONCE AS NEEDED
Status: COMPLETED | OUTPATIENT
Start: 2024-10-09 | End: 2024-10-09

## 2024-10-09 RX ORDER — CLONAZEPAM 0.5 MG/1
0.5 TABLET ORAL DAILY
Status: DISCONTINUED | OUTPATIENT
Start: 2024-10-09 | End: 2024-10-11 | Stop reason: HOSPADM

## 2024-10-09 RX ORDER — ENOXAPARIN SODIUM 100 MG/ML
40 INJECTION SUBCUTANEOUS EVERY 24 HOURS
Status: DISCONTINUED | OUTPATIENT
Start: 2024-10-09 | End: 2024-10-11 | Stop reason: HOSPADM

## 2024-10-09 RX ORDER — HALOPERIDOL 5 MG/ML
2 INJECTION INTRAMUSCULAR ONCE
Status: COMPLETED | OUTPATIENT
Start: 2024-10-09 | End: 2024-10-09

## 2024-10-09 RX ORDER — AZELASTINE 1 MG/ML
1 SPRAY, METERED NASAL 2 TIMES DAILY PRN
COMMUNITY

## 2024-10-09 RX ORDER — OLANZAPINE 5 MG/1
5 TABLET ORAL NIGHTLY
Status: DISCONTINUED | OUTPATIENT
Start: 2024-10-09 | End: 2024-10-09

## 2024-10-09 RX ORDER — HALOPERIDOL 5 MG/ML
1 INJECTION INTRAMUSCULAR ONCE
Status: DISCONTINUED | OUTPATIENT
Start: 2024-10-09 | End: 2024-10-09

## 2024-10-09 RX ORDER — POTASSIUM CHLORIDE 20 MEQ/1
40 TABLET, EXTENDED RELEASE ORAL ONCE
Status: COMPLETED | OUTPATIENT
Start: 2024-10-09 | End: 2024-10-09

## 2024-10-09 RX ORDER — POLYETHYLENE GLYCOL 3350 17 G/17G
17 POWDER, FOR SOLUTION ORAL DAILY
Status: DISCONTINUED | OUTPATIENT
Start: 2024-10-09 | End: 2024-10-11 | Stop reason: HOSPADM

## 2024-10-09 RX ORDER — LORAZEPAM 2 MG/ML
1 INJECTION INTRAMUSCULAR ONCE
Status: COMPLETED | OUTPATIENT
Start: 2024-10-09 | End: 2024-10-09

## 2024-10-09 RX ORDER — OLANZAPINE 5 MG/1
5 TABLET, ORALLY DISINTEGRATING ORAL NIGHTLY
Status: DISCONTINUED | OUTPATIENT
Start: 2024-10-09 | End: 2024-10-11 | Stop reason: HOSPADM

## 2024-10-09 SDOH — ECONOMIC STABILITY: INCOME INSECURITY: IN THE LAST 12 MONTHS, WAS THERE A TIME WHEN YOU WERE NOT ABLE TO PAY THE MORTGAGE OR RENT ON TIME?: NO

## 2024-10-09 SDOH — ECONOMIC STABILITY: FOOD INSECURITY: WITHIN THE PAST 12 MONTHS, YOU WORRIED THAT YOUR FOOD WOULD RUN OUT BEFORE YOU GOT MONEY TO BUY MORE.: NEVER TRUE

## 2024-10-09 SDOH — ECONOMIC STABILITY: INCOME INSECURITY: HOW HARD IS IT FOR YOU TO PAY FOR THE VERY BASICS LIKE FOOD, HOUSING, MEDICAL CARE, AND HEATING?: NOT VERY HARD

## 2024-10-09 SDOH — SOCIAL STABILITY: SOCIAL INSECURITY: WITHIN THE LAST YEAR, HAVE YOU BEEN HUMILIATED OR EMOTIONALLY ABUSED IN OTHER WAYS BY YOUR PARTNER OR EX-PARTNER?: NO

## 2024-10-09 SDOH — SOCIAL STABILITY: SOCIAL INSECURITY: ABUSE: ADULT

## 2024-10-09 SDOH — SOCIAL STABILITY: SOCIAL INSECURITY
WITHIN THE LAST YEAR, HAVE TO BEEN RAPED OR FORCED TO HAVE ANY KIND OF SEXUAL ACTIVITY BY YOUR PARTNER OR EX-PARTNER?: NO

## 2024-10-09 SDOH — ECONOMIC STABILITY: TRANSPORTATION INSECURITY
IN THE PAST 12 MONTHS, HAS THE LACK OF TRANSPORTATION KEPT YOU FROM MEDICAL APPOINTMENTS OR FROM GETTING MEDICATIONS?: NO

## 2024-10-09 SDOH — SOCIAL STABILITY: SOCIAL NETWORK: ARE YOU MARRIED, WIDOWED, DIVORCED, SEPARATED, NEVER MARRIED, OR LIVING WITH A PARTNER?: MARRIED

## 2024-10-09 SDOH — SOCIAL STABILITY: SOCIAL INSECURITY
WITHIN THE LAST YEAR, HAVE YOU BEEN RAPED OR FORCED TO HAVE ANY KIND OF SEXUAL ACTIVITY BY YOUR PARTNER OR EX-PARTNER?: NO

## 2024-10-09 SDOH — SOCIAL STABILITY: SOCIAL INSECURITY: DOES ANYONE TRY TO KEEP YOU FROM HAVING/CONTACTING OTHER FRIENDS OR DOING THINGS OUTSIDE YOUR HOME?: NO

## 2024-10-09 SDOH — ECONOMIC STABILITY: FOOD INSECURITY: HOW HARD IS IT FOR YOU TO PAY FOR THE VERY BASICS LIKE FOOD, HOUSING, MEDICAL CARE, AND HEATING?: NOT VERY HARD

## 2024-10-09 SDOH — SOCIAL STABILITY: SOCIAL INSECURITY
WITHIN THE LAST YEAR, HAVE YOU BEEN KICKED, HIT, SLAPPED, OR OTHERWISE PHYSICALLY HURT BY YOUR PARTNER OR EX-PARTNER?: NO

## 2024-10-09 SDOH — ECONOMIC STABILITY: INCOME INSECURITY: IN THE PAST 12 MONTHS HAS THE ELECTRIC, GAS, OIL, OR WATER COMPANY THREATENED TO SHUT OFF SERVICES IN YOUR HOME?: NO

## 2024-10-09 SDOH — ECONOMIC STABILITY: FOOD INSECURITY: WITHIN THE PAST 12 MONTHS, THE FOOD YOU BOUGHT JUST DIDN'T LAST AND YOU DIDN'T HAVE MONEY TO GET MORE.: NEVER TRUE

## 2024-10-09 SDOH — SOCIAL STABILITY: SOCIAL NETWORK: HOW OFTEN DO YOU ATTEND CHURCH OR RELIGIOUS SERVICES?: PATIENT DECLINED

## 2024-10-09 SDOH — HEALTH STABILITY: PHYSICAL HEALTH
ON AVERAGE, HOW MANY DAYS PER WEEK DO YOU ENGAGE IN MODERATE TO STRENUOUS EXERCISE (LIKE A BRISK WALK)?: PATIENT DECLINED

## 2024-10-09 SDOH — HEALTH STABILITY: PHYSICAL HEALTH
HOW OFTEN DO YOU NEED TO HAVE SOMEONE HELP YOU WHEN YOU READ INSTRUCTIONS, PAMPHLETS, OR OTHER WRITTEN MATERIAL FROM YOUR DOCTOR OR PHARMACY?: RARELY

## 2024-10-09 SDOH — ECONOMIC STABILITY: FOOD INSECURITY: WITHIN THE PAST 12 MONTHS, YOU WORRIED THAT YOUR FOOD WOULD RUN OUT BEFORE YOU GOT THE MONEY TO BUY MORE.: NEVER TRUE

## 2024-10-09 SDOH — ECONOMIC STABILITY: HOUSING INSECURITY: IN THE LAST 12 MONTHS, WAS THERE A TIME WHEN YOU WERE NOT ABLE TO PAY THE MORTGAGE OR RENT ON TIME?: NO

## 2024-10-09 SDOH — SOCIAL STABILITY: SOCIAL NETWORK: HOW OFTEN DO YOU GET TOGETHER WITH FRIENDS OR RELATIVES?: PATIENT DECLINED

## 2024-10-09 SDOH — HEALTH STABILITY: PHYSICAL HEALTH: ON AVERAGE, HOW MANY MINUTES DO YOU ENGAGE IN EXERCISE AT THIS LEVEL?: PATIENT DECLINED

## 2024-10-09 SDOH — ECONOMIC STABILITY: HOUSING INSECURITY: AT ANY TIME IN THE PAST 12 MONTHS, WERE YOU HOMELESS OR LIVING IN A SHELTER (INCLUDING NOW)?: NO

## 2024-10-09 SDOH — SOCIAL STABILITY: SOCIAL INSECURITY: DO YOU FEEL ANYONE HAS EXPLOITED OR TAKEN ADVANTAGE OF YOU FINANCIALLY OR OF YOUR PERSONAL PROPERTY?: NO

## 2024-10-09 SDOH — SOCIAL STABILITY: SOCIAL INSECURITY: HAVE YOU HAD THOUGHTS OF HARMING ANYONE ELSE?: NO

## 2024-10-09 SDOH — SOCIAL STABILITY: SOCIAL NETWORK: IN A TYPICAL WEEK, HOW MANY TIMES DO YOU TALK ON THE PHONE WITH FAMILY, FRIENDS, OR NEIGHBORS?: PATIENT DECLINED

## 2024-10-09 SDOH — HEALTH STABILITY: MENTAL HEALTH
HOW OFTEN DO YOU NEED TO HAVE SOMEONE HELP YOU WHEN YOU READ INSTRUCTIONS, PAMPHLETS, OR OTHER WRITTEN MATERIAL FROM YOUR DOCTOR OR PHARMACY?: SOMETIMES

## 2024-10-09 SDOH — SOCIAL STABILITY: SOCIAL INSECURITY: ARE YOU MARRIED, WIDOWED, DIVORCED, SEPARATED, NEVER MARRIED, OR LIVING WITH A PARTNER?: MARRIED

## 2024-10-09 SDOH — ECONOMIC STABILITY: TRANSPORTATION INSECURITY
IN THE PAST 12 MONTHS, HAS LACK OF TRANSPORTATION KEPT YOU FROM MEETINGS, WORK, OR FROM GETTING THINGS NEEDED FOR DAILY LIVING?: NO

## 2024-10-09 SDOH — SOCIAL STABILITY: SOCIAL NETWORK
DO YOU BELONG TO ANY CLUBS OR ORGANIZATIONS SUCH AS CHURCH GROUPS UNIONS, FRATERNAL OR ATHLETIC GROUPS, OR SCHOOL GROUPS?: PATIENT DECLINED

## 2024-10-09 SDOH — SOCIAL STABILITY: SOCIAL INSECURITY: HAS ANYONE EVER THREATENED TO HURT YOUR FAMILY OR YOUR PETS?: NO

## 2024-10-09 SDOH — SOCIAL STABILITY: SOCIAL INSECURITY: ARE YOU MARRIED, WIDOWED, DIVORCED, SEPARATED, NEVER MARRIED, OR LIVING WITH A PARTNER?: PATIENT DECLINED

## 2024-10-09 SDOH — ECONOMIC STABILITY: INCOME INSECURITY: IN THE PAST 12 MONTHS, HAS THE ELECTRIC, GAS, OIL, OR WATER COMPANY THREATENED TO SHUT OFF SERVICE IN YOUR HOME?: NO

## 2024-10-09 SDOH — ECONOMIC STABILITY: HOUSING INSECURITY: IN THE PAST 12 MONTHS, HOW MANY TIMES HAVE YOU MOVED WHERE YOU WERE LIVING?: 1

## 2024-10-09 SDOH — SOCIAL STABILITY: SOCIAL INSECURITY: DO YOU FEEL UNSAFE GOING BACK TO THE PLACE WHERE YOU ARE LIVING?: NO

## 2024-10-09 SDOH — HEALTH STABILITY: MENTAL HEALTH
DO YOU FEEL STRESS - TENSE, RESTLESS, NERVOUS, OR ANXIOUS, OR UNABLE TO SLEEP AT NIGHT BECAUSE YOUR MIND IS TROUBLED ALL THE TIME - THESE DAYS?: PATIENT DECLINED

## 2024-10-09 SDOH — ECONOMIC STABILITY: TRANSPORTATION INSECURITY: IN THE PAST 12 MONTHS, HAS LACK OF TRANSPORTATION KEPT YOU FROM MEDICAL APPOINTMENTS OR FROM GETTING MEDICATIONS?: NO

## 2024-10-09 SDOH — SOCIAL STABILITY: SOCIAL NETWORK: HOW OFTEN DO YOU ATTEND MEETINGS OF THE CLUBS OR ORGANIZATIONS YOU BELONG TO?: PATIENT DECLINED

## 2024-10-09 SDOH — SOCIAL STABILITY: SOCIAL INSECURITY: ARE YOU OR HAVE YOU BEEN THREATENED OR ABUSED PHYSICALLY, EMOTIONALLY, OR SEXUALLY BY ANYONE?: NO

## 2024-10-09 SDOH — SOCIAL STABILITY: SOCIAL INSECURITY: WITHIN THE LAST YEAR, HAVE YOU BEEN AFRAID OF YOUR PARTNER OR EX-PARTNER?: NO

## 2024-10-09 SDOH — SOCIAL STABILITY: SOCIAL INSECURITY: WERE YOU ABLE TO COMPLETE ALL THE BEHAVIORAL HEALTH SCREENINGS?: YES

## 2024-10-09 SDOH — SOCIAL STABILITY: SOCIAL NETWORK: HOW OFTEN DO YOU ATTENT MEETINGS OF THE CLUB OR ORGANIZATION YOU BELONG TO?: PATIENT DECLINED

## 2024-10-09 SDOH — SOCIAL STABILITY: SOCIAL INSECURITY: ARE THERE ANY APPARENT SIGNS OF INJURIES/BEHAVIORS THAT COULD BE RELATED TO ABUSE/NEGLECT?: NO

## 2024-10-09 SDOH — SOCIAL STABILITY: SOCIAL NETWORK: ARE YOU MARRIED, WIDOWED, DIVORCED, SEPARATED, NEVER MARRIED, OR LIVING WITH A PARTNER?: PATIENT DECLINED

## 2024-10-09 SDOH — SOCIAL STABILITY: SOCIAL INSECURITY: HAVE YOU HAD ANY THOUGHTS OF HARMING ANYONE ELSE?: NO

## 2024-10-09 SDOH — SOCIAL STABILITY: SOCIAL NETWORK
DO YOU BELONG TO ANY CLUBS OR ORGANIZATIONS SUCH AS CHURCH GROUPS, UNIONS, FRATERNAL OR ATHLETIC GROUPS, OR SCHOOL GROUPS?: PATIENT DECLINED

## 2024-10-09 SDOH — HEALTH STABILITY: MENTAL HEALTH
STRESS IS WHEN SOMEONE FEELS TENSE, NERVOUS, ANXIOUS, OR CAN'T SLEEP AT NIGHT BECAUSE THEIR MIND IS TROUBLED. HOW STRESSED ARE YOU?: PATIENT DECLINED

## 2024-10-09 ASSESSMENT — ACTIVITIES OF DAILY LIVING (ADL)
HEARING - RIGHT EAR: FUNCTIONAL
HEARING - LEFT EAR: FUNCTIONAL
ADEQUATE_TO_COMPLETE_ADL: YES
ASSISTIVE_DEVICE: WALKER
GROOMING: NEEDS ASSISTANCE
DRESSING YOURSELF: NEEDS ASSISTANCE
BATHING: NEEDS ASSISTANCE
JUDGMENT_ADEQUATE_SAFELY_COMPLETE_DAILY_ACTIVITIES: NO
LACK_OF_TRANSPORTATION: NO
PATIENT'S MEMORY ADEQUATE TO SAFELY COMPLETE DAILY ACTIVITIES?: NO
FEEDING YOURSELF: NEEDS ASSISTANCE
WALKS IN HOME: NEEDS ASSISTANCE
TOILETING: NEEDS ASSISTANCE
LACK_OF_TRANSPORTATION: NO

## 2024-10-09 ASSESSMENT — COGNITIVE AND FUNCTIONAL STATUS - GENERAL
STANDING UP FROM CHAIR USING ARMS: A LITTLE
DRESSING REGULAR LOWER BODY CLOTHING: A LITTLE
MOBILITY SCORE: 18
TURNING FROM BACK TO SIDE WHILE IN FLAT BAD: A LITTLE
TURNING FROM BACK TO SIDE WHILE IN FLAT BAD: A LITTLE
HELP NEEDED FOR BATHING: A LITTLE
MOVING TO AND FROM BED TO CHAIR: A LITTLE
PERSONAL GROOMING: A LITTLE
CLIMB 3 TO 5 STEPS WITH RAILING: A LITTLE
EATING MEALS: A LITTLE
DAILY ACTIVITIY SCORE: 18
EATING MEALS: A LITTLE
DAILY ACTIVITIY SCORE: 18
HELP NEEDED FOR BATHING: A LITTLE
WALKING IN HOSPITAL ROOM: A LITTLE
DRESSING REGULAR LOWER BODY CLOTHING: A LITTLE
STANDING UP FROM CHAIR USING ARMS: A LITTLE
CLIMB 3 TO 5 STEPS WITH RAILING: A LITTLE
DRESSING REGULAR UPPER BODY CLOTHING: A LITTLE
PATIENT BASELINE BEDBOUND: NO
TOILETING: A LITTLE
STANDING UP FROM CHAIR USING ARMS: A LITTLE
DAILY ACTIVITIY SCORE: 18
PERSONAL GROOMING: A LITTLE
MOVING TO AND FROM BED TO CHAIR: A LITTLE
DRESSING REGULAR UPPER BODY CLOTHING: A LITTLE
MOVING FROM LYING ON BACK TO SITTING ON SIDE OF FLAT BED WITH BEDRAILS: A LITTLE
MOVING FROM LYING ON BACK TO SITTING ON SIDE OF FLAT BED WITH BEDRAILS: A LITTLE
CLIMB 3 TO 5 STEPS WITH RAILING: A LITTLE
HELP NEEDED FOR BATHING: A LITTLE
WALKING IN HOSPITAL ROOM: A LITTLE
TURNING FROM BACK TO SIDE WHILE IN FLAT BAD: A LITTLE
TOILETING: A LITTLE
TOILETING: A LITTLE
MOBILITY SCORE: 18
PERSONAL GROOMING: A LITTLE
MOVING TO AND FROM BED TO CHAIR: A LITTLE
DRESSING REGULAR LOWER BODY CLOTHING: A LITTLE
MOVING FROM LYING ON BACK TO SITTING ON SIDE OF FLAT BED WITH BEDRAILS: A LITTLE
MOBILITY SCORE: 18
DRESSING REGULAR UPPER BODY CLOTHING: A LITTLE
EATING MEALS: A LITTLE
WALKING IN HOSPITAL ROOM: A LITTLE

## 2024-10-09 ASSESSMENT — LIFESTYLE VARIABLES
SKIP TO QUESTIONS 9-10: 1
HAVE YOU EVER FELT YOU SHOULD CUT DOWN ON YOUR DRINKING: NO
HOW OFTEN DO YOU HAVE A DRINK CONTAINING ALCOHOL: NEVER
EVER HAD A DRINK FIRST THING IN THE MORNING TO STEADY YOUR NERVES TO GET RID OF A HANGOVER: NO
AUDIT-C TOTAL SCORE: 0
HOW OFTEN DO YOU HAVE 6 OR MORE DRINKS ON ONE OCCASION: NEVER
SUBSTANCE_ABUSE_PAST_12_MONTHS: NO
PRESCIPTION_ABUSE_PAST_12_MONTHS: NO
AUDIT-C TOTAL SCORE: 0
HAVE PEOPLE ANNOYED YOU BY CRITICIZING YOUR DRINKING: NO
TOTAL SCORE: 0
HOW MANY STANDARD DRINKS CONTAINING ALCOHOL DO YOU HAVE ON A TYPICAL DAY: PATIENT DOES NOT DRINK
EVER FELT BAD OR GUILTY ABOUT YOUR DRINKING: NO

## 2024-10-09 ASSESSMENT — PATIENT HEALTH QUESTIONNAIRE - PHQ9
SUM OF ALL RESPONSES TO PHQ9 QUESTIONS 1 & 2: 0
1. LITTLE INTEREST OR PLEASURE IN DOING THINGS: NOT AT ALL
2. FEELING DOWN, DEPRESSED OR HOPELESS: NOT AT ALL

## 2024-10-09 ASSESSMENT — PAIN SCALES - GENERAL
PAINLEVEL_OUTOF10: 0 - NO PAIN
PAINLEVEL_OUTOF10: 0 - NO PAIN

## 2024-10-09 ASSESSMENT — PAIN - FUNCTIONAL ASSESSMENT: PAIN_FUNCTIONAL_ASSESSMENT: 0-10

## 2024-10-09 NOTE — ED PROVIDER NOTES
"EMERGENCY DEPARTMENT ENCOUNTER      Pt Name: Enid Sheffield  MRN: [id number]  Birthdate 1960  Date of evaluation: 10/9/2024  Provider: Leonidas Morales DO    CHIEF COMPLAINT       Chief Complaint   Patient presents with    Altered Mental Status     Patient is more confused than normal since 5 pm per ; patient has cerebral palsy; patient did not want to come in; normally alert and oriented x4; patient is now A&O x 2         HISTORY OF PRESENT ILLNESS    HPI    64-year-old female with past medical history significant for cerebral palsy status post brain stimulator placement, hypertension, rheumatoid arthritis presenting to the emergency department for evaluation of altered mental status.  Per EMS report they were called to the patient's residence by  who states patient has been confused since 5 PM last night not making sense and has been hallucinating.  EMS states initially on their evaluation patient was awake alert and oriented x 1-2 however as soon as  left the room patient was reportedly awake alert and oriented x 3 and told EMS she was \"faking confusion\" to test her .   told EMS patient is usually awake alert and oriented x 4 at baseline.  On my evaluation patient is awake alert and oriented x 2 only and does not know why she is at the hospital and is not able to provide further HPI.  On chart review patient was noted to have had a fall on 9/23/2024.  Patient is not on blood thinners.  I spoke with patient's  who arrived later who provided further HPI.  [facility] patient has been acting abnormally over the past 2 weeks after their most recent moved to a new home.  [facility] patient has been staying up all night and aggressively organizing around the home.  Has had abnormal tangential speech over the past 8 hours.  Patient has not been sick and is not complained of abdominal pain, urinary symptoms, nausea, vomiting, fevers, chills, night sweats.  Patient has not had " additional falls since her last fall in 9/23/2024.    Nursing Notes were reviewed.    PAST MEDICAL HISTORY     Past Medical History:   Diagnosis Date    Abrasion of ear region 09/26/2024    Acute gastroenteritis 09/26/2024    Acute lower UTI 09/26/2024    Allergic     Anxiety     Arm pain 09/26/2024    Bilateral impacted cerumen 09/26/2024    C. difficile diarrhea 09/26/2024    Closed Colles' fracture 04/03/2019    Closed fracture of upper end of lower leg 09/10/2012    Dermatitis of ear canal 09/26/2024    Diarrhea 09/26/2024    Ear canal abrasion 09/26/2024    Encounter for immunization 03/31/2017    Need for influenza vaccination    Encounter for screening for malignant neoplasm of cervix 06/11/2016    Screening for malignant neoplasm of cervix    Flank pain 05/05/2018    Generalized hyperhidrosis 08/10/2015    Diaphoresis    Headache 09/26/2024    Hematuria 09/26/2024    Impacted cerumen, bilateral 03/02/2022    Bilateral impacted cerumen    Laceration without foreign body of unspecified eyelid and periocular area, initial encounter 06/11/2016    Eyebrow laceration    Pain in limb 09/10/2012    Pain of upper extremity 09/26/2024    Personal history of other diseases of the circulatory system     History of hypertension    Personal history of other diseases of the digestive system     History of hepatic disease    Personal history of other diseases of the respiratory system 10/03/2014    History of acute bronchitis    Personal history of other diseases of urinary system     History of kidney disease    Unspecified fracture of upper end of unspecified tibia, initial encounter for closed fracture 01/27/2012         SURGICAL HISTORY       Past Surgical History:   Procedure Laterality Date    CERVICAL FUSION  09/06/2013    Cervical Vertebral Fusion    FRACTURE SURGERY  2004    LUMBAR DISCECTOMY  09/06/2013    Spinal Diskectomy    OTHER SURGICAL HISTORY  01/20/2021    Colonoscopy    OTHER SURGICAL HISTORY  03/31/2014     Acellular Dermal Replacement Ankles    WISDOM TOOTH EXTRACTION           CURRENT MEDICATIONS       Current Discharge Medication List        CONTINUE these medications which have NOT CHANGED    Details   azelastine (Astelin) 137 mcg (0.1 %) nasal spray Administer 1 spray into each nostril 2 times a day as needed for rhinitis.      calcium carbonate-vitamin D3 (Caltrate 600 plus D) 600 mg-20 mcg (800 unit) tablet,chewable Chew 1 tablet once daily.      clonazePAM (KlonoPIN) 0.5 mg tablet Take 1 tablet (0.5 mg) by mouth once daily.      EnbreL SureClick 50 mg/mL (1 mL) pen injector pen injection Inject 1 mL (50 mg) under the skin. Every 10 days      rosuvastatin (Crestor) 5 mg tablet Take 1 tablet (5 mg) by mouth every other day.      sertraline (Zoloft) 100 mg tablet Take 1 tablet (100 mg) by mouth once daily.      traZODone (Desyrel) 50 mg tablet Take 3 tablets (150 mg) by mouth once daily at bedtime.             ALLERGIES     Penicillins and Sulfa (sulfonamide antibiotics)    FAMILY HISTORY       Family History   Problem Relation Name Age of Onset    Diabetes Mother Ma     Other (abdominal aortic aneurysm) Mother Ma     Thyroid disease Mother Ma     Hearing loss Mother Ma     Hypertension Mother Ma     Miscarriages / Stillbirths Mother Ma     Ovarian cancer Mother Ma     Hypertension Sister K6     Other (brain tumor) Sister K6     Diabetes Sister K6     Stroke Sister K6     Tremor Son JK     Mental illness Son JK     Breast cancer Father's Sister BK     Cancer Father's Sister BK     Breast cancer Maternal Grandmother Bev     Hearing loss Father Pa     Hypertension Father Pa     Cancer Sister Kk2     Cancer Sister KK3     Hearing loss Sister KK3     Cancer Father's Sister HARVEY     Miscarriages / Stillbirths Sister KK6           SOCIAL HISTORY       Social History     Socioeconomic History    Marital status:    Tobacco Use    Smoking status: Never    Smokeless tobacco: Never   Substance and Sexual Activity     Alcohol use: Not Currently     Alcohol/week: 2.0 standard drinks of alcohol     Types: 1 Glasses of wine, 1 Cans of beer per week     Comment: Occasional wine/beer    Drug use: Never    Sexual activity: Not Currently     Partners: Male     Birth control/protection: Male Sterilization     Social Determinants of Health     Financial Resource Strain: Low Risk  (10/9/2024)    Overall Financial Resource Strain (CARDIA)     Difficulty of Paying Living Expenses: Not very hard   Food Insecurity: No Food Insecurity (10/9/2024)    Hunger Vital Sign     Worried About Running Out of Food in the Last Year: Never true     Ran Out of Food in the Last Year: Never true   Transportation Needs: No Transportation Needs (10/9/2024)    PRAPARE - Transportation     Lack of Transportation (Medical): No     Lack of Transportation (Non-Medical): No   Physical Activity: Patient Declined (10/9/2024)    Exercise Vital Sign     Days of Exercise per Week: Patient declined     Minutes of Exercise per Session: Patient declined   Stress: Patient Declined (10/9/2024)    [employer] [place] [employer] - Occupational Stress Questionnaire     Feeling of Stress : Patient declined   Social Connections: Patient Declined (10/9/2024)    Social Connection and Isolation Panel [NHANES]     Frequency of Communication with Friends and Family: Patient declined     Frequency of Social Gatherings with Friends and Family: Patient declined     Attends [employer]ology Services: Patient declined     Active Member of Clubs or Organizations: Patient declined     Attends Club or Organization Meetings: Patient declined     Marital Status: Patient declined   Intimate Partner Violence: Not At Risk (10/9/2024)    Humiliation, Afraid, Rape, and Kick questionnaire     Fear of Current or Ex-Partner: No     Emotionally Abused: No     Physically Abused: No     Sexually Abused: No   Housing Stability: Low Risk  (10/9/2024)    Housing Stability Vital Sign     Unable to Pay for  Housing in the Last Year: No     Number of Times Moved in the Last Year: 1     Homeless in the Last Year: No       SCREENINGS                        PHYSICAL EXAM    (up to 7 for level 4, 8 or more for level 5)     ED Triage Vitals [10/09/24 0229]   Temperature Heart Rate Respirations BP   36.1 °C (97 °F) 71 17 160/87      Pulse Ox Temp src Heart Rate Source Patient Position   95 % -- Monitor --      BP Location FiO2 (%)     -- --       Physical Exam  Vitals reviewed.   Constitutional:       General: She is not in acute distress.     Appearance: She is well-developed. She is not ill-appearing, toxic-appearing or diaphoretic.   HENT:      Head: Normocephalic and atraumatic.      Mouth/Throat:      Mouth: Mucous membranes are moist.      Pharynx: Oropharynx is clear.   Eyes:      Extraocular Movements: Extraocular movements intact.      Right eye: No nystagmus.      Left eye: No nystagmus.      Pupils: Pupils are equal, round, and reactive to light.   Cardiovascular:      Rate and Rhythm: Normal rate and regular rhythm.      Heart sounds: Normal heart sounds.   Pulmonary:      Effort: Pulmonary effort is normal.      Breath sounds: Normal breath sounds.   Abdominal:      General: There is no distension.      Palpations: Abdomen is soft. There is no mass.      Tenderness: There is no abdominal tenderness. There is no guarding.   Musculoskeletal:         General: No swelling or tenderness. Normal range of motion.      Cervical back: Normal range of motion and neck supple. No rigidity.   Lymphadenopathy:      Cervical: No cervical adenopathy.   Skin:     General: Skin is warm and dry.      Coloration: Skin is not cyanotic or pale.      Findings: No erythema or rash.   Neurological:      Mental Status: She is alert.      Sensory: No sensory deficit.      Motor: No weakness.      Comments: Patient is awake and alert to person and place but not to time or event.  NIH of 2 given patient is not able to answer month or year.   Van negative.  Tangential thoughts and asked to be redirected multiple times to answer even the most basic questions or follow the most basic commands.  Appears to be reaching for objects in front of further not present.          DIAGNOSTIC RESULTS     LABS:  Labs Reviewed   COMPREHENSIVE METABOLIC PANEL - Abnormal       Result Value    Glucose 140 (*)     Sodium 142      Potassium 3.3 (*)     Chloride 107      Bicarbonate 26      Anion Gap 12      Urea Nitrogen 12      Creatinine 0.83      eGFR 79      Calcium 8.8      Albumin 4.2      Alkaline Phosphatase 71      Total Protein 6.1 (*)     AST 21      Bilirubin, Total 0.8      ALT 16     URINALYSIS WITH REFLEX CULTURE AND MICROSCOPIC - Abnormal    Color, Urine Yellow      Appearance, Urine Clear      Specific Gravity, Urine 1.021      pH, Urine 6.0      Protein, Urine 20 (TRACE)      Glucose, Urine Normal      Blood, Urine 0.2 (2+) (*)     Ketones, Urine NEGATIVE      Bilirubin, Urine NEGATIVE      Urobilinogen, Urine 2 (1+) (*)     Nitrite, Urine NEGATIVE      Leukocyte Esterase, Urine NEGATIVE     URINALYSIS MICROSCOPIC WITH REFLEX CULTURE - Abnormal    WBC, Urine 1-5      RBC, Urine 6-10 (*)     Budding Yeast, Urine PRESENT (*)     Mucus, Urine 3+      Hyaline Casts, Urine 2+ (*)    MAGNESIUM - Normal    Magnesium 1.98     LIPASE - Normal    Lipase 38      Narrative:     Venipuncture immediately after or during the administration of Metamizole may lead to falsely low results. Testing should be performed immediately prior to Metamizole dosing.   LACTATE - Normal    Lactate 2.0      Narrative:     Venipuncture immediately after or during the administration of Metamizole may lead to falsely low results. Testing should be performed immediately prior to Metamizole dosing.   SARS-COV-2 PCR - Normal    Coronavirus 2019, PCR Not Detected      Narrative:     This assay has received FDA Emergency Use Authorization (EUA) and is only authorized for the duration of time that  circumstances exist to justify the authorization of the emergency use of in vitro diagnostic tests for the detection of SARS-CoV-2 virus and/or diagnosis of COVID-19 infection under section 564(b)(1) of the Act, 21 U.S.C. 360bbb-3(b)(1). This assay is an in vitro diagnostic nucleic acid amplification test for the qualitative detection of SARS-CoV-2 from nasopharyngeal specimens and has been validated for use at [facility]. Negative results do not preclude COVID-19 infections and should not be used as the sole basis for diagnosis, treatment, or other management decisions.     INFLUENZA A AND B PCR - Normal    Flu A Result Not Detected      Flu B Result Not Detected      Narrative:     This assay is an in vitro diagnostic multiplex nucleic acid amplification test for the detection and discrimination of Influenza A & B from nasopharyngeal specimens, and has been validated for use at [facility]. Negative results do not preclude Influenza A/B infections, and should not be used as the sole basis for diagnosis, treatment, or other management decisions. If Influenza A/B and RSV PCR results are negative, testing for Parainfluenza virus, Adenovirus and Metapneumovirus is routinely performed for [facility] pediatric oncology and intensive care inpatients, and is available on other patients by placing an add-on request.   SERIAL TROPONIN-INITIAL - Normal    Troponin I, High Sensitivity 8      Narrative:     Less than 99th percentile of normal range cutoff-  Female and children under 18 years old <14 ng/L; Male <21 ng/L: Negative  Repeat testing should be performed if clinically indicated.     Female and children under 18 years old 14-50 ng/L; Male 21-50 ng/L:  Consistent with possible cardiac damage and possible increased clinical   risk. Serial measurements may help to assess extent of myocardial damage.     >50 ng/L: Consistent with cardiac damage, increased clinical risk  and  myocardial infarction. Serial measurements may help assess extent of   myocardial damage.      NOTE: Children less than 1 year old may have higher baseline troponin   levels and results should be interpreted in conjunction with the overall   clinical context.     NOTE: Troponin I testing is performed using a different   testing methodology at [facility] than at other   [facility]. Direct result comparisons should only   be made within the same method.   SERIAL TROPONIN, 1 HOUR - Normal    Troponin I, High Sensitivity 8      Narrative:     Less than 99th percentile of normal range cutoff-  Female and children under 18 years old <14 ng/L; Male <21 ng/L: Negative  Repeat testing should be performed if clinically indicated.     Female and children under 18 years old 14-50 ng/L; Male 21-50 ng/L:  Consistent with possible cardiac damage and possible increased clinical   risk. Serial measurements may help to assess extent of myocardial damage.     >50 ng/L: Consistent with cardiac damage, increased clinical risk and  myocardial infarction. Serial measurements may help assess extent of   myocardial damage.      NOTE: Children less than 1 year old may have higher baseline troponin   levels and results should be interpreted in conjunction with the overall   clinical context.     NOTE: Troponin I testing is performed using a different   testing methodology at [facility] than at other   [facility]. Direct result comparisons should only   be made within the same method.   CBC WITH AUTO DIFFERENTIAL    WBC 8.2      nRBC 0.0      RBC 4.74      Hemoglobin 13.8      Hematocrit 40.7      MCV 86      MCH 29.1      MCHC 33.9      RDW 11.8      Platelets 192      Neutrophils % 64.7      Immature Granulocytes %, Automated 0.1      Lymphocytes % 24.0      Monocytes % 7.4      Eosinophils % 3.3      Basophils % 0.5      Neutrophils Absolute 5.28      Immature Granulocytes Absolute, Automated  0.01      Lymphocytes Absolute 1.96      Monocytes Absolute 0.60      Eosinophils Absolute 0.27      Basophils Absolute 0.04     TROPONIN SERIES- (INITIAL, 1 HR)    Narrative:     The following orders were created for panel order Troponin I Series, High Sensitivity (0, 1 HR).  Procedure                               Abnormality         Status                     ---------                               -----------         ------                     Troponin I, High Sensiti...[[id number]]  Normal              Final result               Troponin, High Sensitivi...[[id number]]  Normal              Final result                 Please view results for these tests on the individual orders.   URINALYSIS WITH REFLEX CULTURE AND MICROSCOPIC    Narrative:     The following orders were created for panel order Urinalysis with Reflex Culture and Microscopic.  Procedure                               Abnormality         Status                     ---------                               -----------         ------                     Urinalysis with Reflex C...[[id number]]  Abnormal            Final result               Extra Urine Gray Tube[[id number]]                            In process                   Please view results for these tests on the individual orders.   EXTRA URINE GRAY TUBE       All other labs were within normal range or not returned as of this dictation.    Imaging  CT head wo IV contrast   Final Result   No acute intracranial abnormality.        Bilateral deep brain stimulating probes, 2 on the right and 1 on the   left, all of which terminate near the subthalamic nuclei.        MACRO:   None.        Signed by: Adi Tobin 10/9/2024 3:06 AM   Dictation workstation:   [id number]      XR chest 1 view   Final Result   No acute pulmonary abnormality.   Signed by Lan Velasquez MD           Procedures  Procedures     EMERGENCY DEPARTMENT COURSE/MDM:     Diagnoses as of 10/09/24 0903   Altered mental status,  unspecified altered mental status type        Medical Decision Making    64-year-old female with past medical history significant for cerebral palsy status post brain stimulator placement, hypertension, rheumatoid arthritis presenting to the emergency department for evaluation of altered mental status.  Hemodynamically stable, confused but in no acute distress, nontoxic-appearing, afebrile.  No focal neurodeficits on exam.  CT head without IV contrast, CBC, CMP, urinalysis, cardiac workup, respiratory swabs ordered.    Unable to obtain EKG due to patient's deep brain stimulator.  Labs and imaging unremarkable for acute pathology other than mild hypokalemia 3.3 which was repleted with 40 mill equivalents of p.o. potassium.  Patient case discussed with teaching service who agreed with patient to medicine for further evaluation and treatment of altered mental status.    Patient and or family in agreement and understanding of treatment plan.  All questions answered.      I reviewed the case with the attending ED physician. The attending ED physician agrees with the plan. Patient and/or patient´s representative was counseled regarding labs, imaging, likely diagnosis, and plan. All questions were answered.    ED Medications administered this visit:    Medications   enoxaparin (Lovenox) syringe 40 mg (40 mg subcutaneous Given 10/9/24 0710)   polyethylene glycol (Glycolax, Miralax) packet 17 g (has no administration in time range)   potassium chloride CR (Klor-Con M20) ER tablet 40 mEq (40 mEq oral Given 10/9/24 0354)       New Prescriptions from this visit:    Current Discharge Medication List          Follow-up:  No follow-up provider specified.      Final Impression:   1. Altered mental status, unspecified altered mental status type          (Please note that portions of this note were completed with a voice recognition program.  Efforts were made to edit the dictations but occasionally words are mis-transcribed.)      Leonidas Morales, DO  Resident  10/09/24 0904

## 2024-10-09 NOTE — NURSING NOTE
Patient alert and oriented with rambling speech. Patient has difficulty focusing on questions in conversation. Patient yells out inappropriately.  not at bedside this shift and patient had some difficulty recalling medical history. No c/o pain

## 2024-10-09 NOTE — CONSULTS
Reason for consult:  AMS    History Of Present Illness  Enid Sheffield is a 64 y.o. female presenting with MDD, CP on DBS for tremors.  Limited information obtained from patient  Gave permission to talk to her   Bradly Abdullahi reported:  This is clearly not her baseline  Moved home recently, overwhelmed with anxiety symptoms  Poor sleep and obsessed with various things  Almost manic presentation which her  could not control  Racing thoughts with bizarre ideas  Non sensical speech, uncooperative  Mad at him for bringing to hospital  2016- attempted suicide when she got admitted  2018- she was admitted for depression at [facility]  Trazodone 150 mg, klonopin 0.5 mg po q daily and zoloft 100 mg daily  Was seeing Dr Stinson last visit was 2 months ago      Past Medical History  She has a past medical history of Abrasion of ear region (09/26/2024), Acute gastroenteritis (09/26/2024), Acute lower UTI (09/26/2024), Allergic, Anxiety, Arm pain (09/26/2024), Bilateral impacted cerumen (09/26/2024), C. difficile diarrhea (09/26/2024), Closed Colles' fracture (04/03/2019), Closed fracture of upper end of lower leg (09/10/2012), Dermatitis of ear canal (09/26/2024), Diarrhea (09/26/2024), Ear canal abrasion (09/26/2024), Encounter for immunization (03/31/2017), Encounter for screening for malignant neoplasm of cervix (06/11/2016), Flank pain (05/05/2018), Generalized hyperhidrosis (08/10/2015), Headache (09/26/2024), Hematuria (09/26/2024), Impacted cerumen, bilateral (03/02/2022), Laceration without foreign body of unspecified eyelid and periocular area, initial encounter (06/11/2016), Pain in limb (09/10/2012), Pain of upper extremity (09/26/2024), Personal history of other diseases of the circulatory system, Personal history of other diseases of the digestive system, Personal history of other diseases of the respiratory system (10/03/2014), Personal history of other diseases of urinary system, and  "Unspecified fracture of upper end of unspecified tibia, initial encounter for closed fracture (01/27/2012).    Surgical History  She has a past surgical history that includes Lumbar discectomy (09/06/2013); Cervical fusion (09/06/2013); Other surgical history (01/20/2021); Other surgical history (03/31/2014); [place] tooth extraction; and Fracture surgery (2004).     Social History  She reports that she has never smoked. She has never used smokeless tobacco. She reports that she does not currently use alcohol after a past usage of about 2.0 standard drinks of alcohol per week. She reports that she does not use drugs.     Allergies  Penicillins and Sulfa (sulfonamide antibiotics)    Review of Systems    Psychiatric ROS - Adult  Anxiety: General Anxiety Disorder (BOOM)BOOM Behaviors: difficult to control worry  Depression: negative  Delirium: acute inattention  Psychosis: delusions  Prabha: not needing much sleep, periods of extra energy/hyperactivity, and talking fast  Safety Issues:   Psychiatric ROS Comment:     Physical Exam      Mental Status Exam  General: alert and oriented x 2  Appearance: stated age  Attitude: not cooperative  Behavior: agitated  Motor Activity: increased  Speech: rapid and pressured  Mood: anxious, tensed  Affect: labile  Thought Process: disorganized  Thought Content: delusional  Thought Perception: denies  Cognition:   Insight: poor  Judgement: poor    Psychiatric Risk Assessment  High risk of impulsivity and agitation    Last Recorded Vitals  Blood pressure 173/90, pulse 61, temperature 36.6 °C (97.9 °F), temperature source Temporal, resp. rate 16, height 1.676 m (5' 6\"), weight 75.2 kg (165 lb 12.6 oz), SpO2 99%.    Relevant Results  Results for orders placed or performed during the hospital encounter of 10/09/24 (from the past 96 hour(s))   CBC and Auto Differential   Result Value Ref Range    WBC 8.2 4.4 - 11.3 x10*3/uL    nRBC 0.0 0.0 - 0.0 /100 WBCs    RBC 4.74 4.00 - 5.20 x10*6/uL    " Hemoglobin 13.8 12.0 - 16.0 g/dL    Hematocrit 40.7 36.0 - 46.0 %    MCV 86 80 - 100 fL    MCH 29.1 26.0 - 34.0 pg    MCHC 33.9 32.0 - 36.0 g/dL    RDW 11.8 11.5 - 14.5 %    Platelets 192 150 - 450 x10*3/uL    Neutrophils % 64.7 40.0 - 80.0 %    Immature Granulocytes %, Automated 0.1 0.0 - 0.9 %    Lymphocytes % 24.0 13.0 - 44.0 %    Monocytes % 7.4 2.0 - 10.0 %    Eosinophils % 3.3 0.0 - 6.0 %    Basophils % 0.5 0.0 - 2.0 %    Neutrophils Absolute 5.28 1.20 - 7.70 x10*3/uL    Immature Granulocytes Absolute, Automated 0.01 0.00 - 0.70 x10*3/uL    Lymphocytes Absolute 1.96 1.20 - 4.80 x10*3/uL    Monocytes Absolute 0.60 0.10 - 1.00 x10*3/uL    Eosinophils Absolute 0.27 0.00 - 0.70 x10*3/uL    Basophils Absolute 0.04 0.00 - 0.10 x10*3/uL   Magnesium   Result Value Ref Range    Magnesium 1.98 1.60 - 2.40 mg/dL   Comprehensive metabolic panel   Result Value Ref Range    Glucose 140 (H) 74 - 99 mg/dL    Sodium 142 136 - 145 mmol/L    Potassium 3.3 (L) 3.5 - 5.3 mmol/L    Chloride 107 98 - 107 mmol/L    Bicarbonate 26 21 - 32 mmol/L    Anion Gap 12 10 - 20 mmol/L    Urea Nitrogen 12 6 - 23 mg/dL    Creatinine 0.83 0.50 - 1.05 mg/dL    eGFR 79 >60 mL/min/1.73m*2    Calcium 8.8 8.6 - 10.3 mg/dL    Albumin 4.2 3.4 - 5.0 g/dL    Alkaline Phosphatase 71 33 - 136 U/L    Total Protein 6.1 (L) 6.4 - 8.2 g/dL    AST 21 9 - 39 U/L    Bilirubin, Total 0.8 0.0 - 1.2 mg/dL    ALT 16 7 - 45 U/L   Lipase   Result Value Ref Range    Lipase 38 9 - 82 U/L   Lactate   Result Value Ref Range    Lactate 2.0 0.4 - 2.0 mmol/L   Troponin I, High Sensitivity, Initial   Result Value Ref Range    Troponin I, High Sensitivity 8 0 - 13 ng/L   TSH   Result Value Ref Range    Thyroid Stimulating Hormone 5.24 (H) 0.44 - 3.98 mIU/L   C-reactive protein   Result Value Ref Range    C-Reactive Protein 0.10 <1.00 mg/dL   Sars-CoV-2 PCR   Result Value Ref Range    Coronavirus 2019, PCR Not Detected Not Detected   Influenza A, and B PCR   Result Value Ref  Range    Flu A Result Not Detected Not Detected    Flu B Result Not Detected Not Detected   Lavender Top   Result Value Ref Range    Extra Tube Hold for add-ons.    Urinalysis with Reflex Culture and Microscopic   Result Value Ref Range    Color, Urine Yellow Light-Yellow, Yellow, Dark-Yellow    Appearance, Urine Clear Clear    Specific Gravity, Urine 1.021 1.005 - 1.035    pH, Urine 6.0 5.0, 5.5, 6.0, 6.5, 7.0, 7.5, 8.0    Protein, Urine 20 (TRACE) NEGATIVE, 10 (TRACE), 20 (TRACE) mg/dL    Glucose, Urine Normal Normal mg/dL    Blood, Urine 0.2 (2+) (A) NEGATIVE    Ketones, Urine NEGATIVE NEGATIVE mg/dL    Bilirubin, Urine NEGATIVE NEGATIVE    Urobilinogen, Urine 2 (1+) (A) Normal mg/dL    Nitrite, Urine NEGATIVE NEGATIVE    Leukocyte Esterase, Urine NEGATIVE NEGATIVE   Extra Urine Gray Tube   Result Value Ref Range    Extra Tube Hold for add-ons.    Urinalysis Microscopic   Result Value Ref Range    WBC, Urine 1-5 1-5, NONE /HPF    RBC, Urine 6-10 (A) NONE, 1-2, 3-5 /HPF    Budding Yeast, Urine PRESENT (A) NONE /HPF    Mucus, Urine 3+ Reference range not established. /LPF    Hyaline Casts, Urine 2+ (A) NONE /LPF   Troponin, High Sensitivity, 1 Hour   Result Value Ref Range    Troponin I, High Sensitivity 8 0 - 13 ng/L   Ammonia   Result Value Ref Range    Ammonia 28 16 - 53 umol/L     XR chest 1 view    Result Date: 10/9/2024  STUDY: Chest Radiograph;  10/9/2024 INDICATION: Altered mental status. COMPARISON: None Available ACCESSION NUMBER(S): [id number] ORDERING CLINICIAN: ALPA KELLY TECHNIQUE:  Frontal chest was obtained at 02:51 hours. FINDINGS: CARDIOMEDIASTINAL SILHOUETTE: Cardiomediastinal silhouette is mildly enlarged in size and configuration.  LUNGS: Lungs are clear.  ABDOMEN: No remarkable upper abdominal findings.  BONES: No acute osseous changes.    No acute pulmonary abnormality. Signed by Lan Velasquez MD    CT head wo IV contrast    Result Date: 10/9/2024  Interpreted By:  Adi Tobin,  STUDY: CT HEAD WO IV CONTRAST;  10/9/2024 2:59 am   INDICATION: Signs/Symptoms:AMS.     COMPARISON: 07/04/2016 CT head without contrast   ACCESSION NUMBER(S): [id number]   ORDERING CLINICIAN: ALPA KELLY   TECHNIQUE: Noncontrast axial CT images of head were obtained with coronal and sagittal reconstructed images.   FINDINGS: BRAIN PARENCHYMA: There are 3 D brain stimulator probes, 2 on the right and 1 on the left, all of which terminating near the region of the bilateral subthalamic nuclei. No acute intraparenchymal hemorrhage or parenchymal evidence of acute large territory ischemic infarct. Gray-white matter distinction is preserved. No mass-effect.   VENTRICLES and EXTRA-AXIAL SPACES:  No acute extra-axial or intraventricular hemorrhage. No effacement of cerebral sulci. The ventricles and sulci are age-concordant.   PARANASAL SINUSES/MASTOIDS:  No hemorrhage or air-fluid levels within the visualized paranasal sinuses. The mastoids are well aerated.   CALVARIUM/ORBITS: There are 2 right frontal sean holes and a left frontal sean hole for deep brain stimulating probe access. No skull fracture.  The orbits and globes are intact to the extent visualized.   EXTRACRANIAL SOFT TISSUES: No discernible abnormality.       No acute intracranial abnormality.   Bilateral deep brain stimulating probes, 2 on the right and 1 on the left, all of which terminate near the subthalamic nuclei.   MACRO: None.   Signed by: Adi Tobin 10/9/2024 3:06 AM Dictation workstation:   [id number]    XR SINUS 3V PA/WATER/LAT    Result Date: 9/26/2024  * * *Final Report* * * DATE OF EXAM: Sep 23 2024 11:03AM   CRX   5602  -  XR SINUS 3V PA/FRANK/LAT  / ACCESSION #  [id number] PROCEDURE REASON: Tenderness over maxillary sinus      * * * * Physician Interpretation * * * *  EXAMINATION:  XR SINUS 3V PA/FRANK/LAT CLINICAL HISTORY:  Tenderness over maxillary sinus Technique:   XR SINUS 3V PA/FRANK/LAT -- NOT APPLICABLE with 3 views on 5  images Comparison: None RESULT: No acute fracture or destructive osseous lesion.  No mucoperiosteal thickening or fluid in the paranasal sinuses.    IMPRESSION: No acute osseous abnormality : DIANA   Transcribe Date/Time: Sep 26 2024 10:12A Dictated by : SEVERINO HART MD This examination was interpreted and the report reviewed and electronically signed by: SEVERINO HART MD on Sep 26 2024 10:13AM  EST       Assessment/Plan   Assessment & Plan  Altered mental status, unspecified altered mental status type      Bipolar manic episode severe    Hold off on ZOLOFT. Continue klonopin daily 0.5 mg  Pt will need admit to psych unit for further stabilization of her mental health  In the meanwhile recommend low dose of zyprexa to control her manic symptoms  Neuro work up pending  Follow up  D/W  who is agreeable to the plan       Ronak Fontana MD

## 2024-10-09 NOTE — CONSULTS
Inpatient consult to Neurology  Consult performed by: Marty Corrigan MD  Consult ordered by: Garry Lemus MD          History Of Present Illness  Enid Sheffield is a 64 y.o. female presenting with altered mental status.    The patient has a history of cerebral palsy.  She has a deep brain stimulator for treatment of dystonia - which was placed in 2021.  History was largely taken from the patient's .  Two weeks ago, they moved to a different house.  Following the move, the patient developed compulsive behavior.  She was constantly organizing all night.  She was compulsively thinking of new orders for her dog.  She started to have very tangential thoughts.  Yesterday, her speech was nonsensical.  Her  notes that she was rambling about 'infinity' and other things that did not make sense in the context of the conversation.  Her  thinks she was hallucinating as it appeared that she was talking to someone who was not there.  Her  said the patient was not complaining of headaches and there was no sign of a fever.    Past Medical History  Past Medical History:   Diagnosis Date    Abrasion of ear region 09/26/2024    Acute gastroenteritis 09/26/2024    Acute lower UTI 09/26/2024    Allergic     Anxiety     Arm pain 09/26/2024    Bilateral impacted cerumen 09/26/2024    C. difficile diarrhea 09/26/2024    Closed Colles' fracture 04/03/2019    Closed fracture of upper end of lower leg 09/10/2012    Dermatitis of ear canal 09/26/2024    Diarrhea 09/26/2024    Ear canal abrasion 09/26/2024    Encounter for immunization 03/31/2017    Need for influenza vaccination    Encounter for screening for malignant neoplasm of cervix 06/11/2016    Screening for malignant neoplasm of cervix    Flank pain 05/05/2018    Generalized hyperhidrosis 08/10/2015    Diaphoresis    Headache 09/26/2024    Hematuria 09/26/2024    Impacted cerumen, bilateral 03/02/2022    Bilateral impacted cerumen    Laceration without  foreign body of unspecified eyelid and periocular area, initial encounter 06/11/2016    Eyebrow laceration    Pain in limb 09/10/2012    Pain of upper extremity 09/26/2024    Personal history of other diseases of the circulatory system     History of hypertension    Personal history of other diseases of the digestive system     History of hepatic disease    Personal history of other diseases of the respiratory system 10/03/2014    History of acute bronchitis    Personal history of other diseases of urinary system     History of kidney disease    Unspecified fracture of upper end of unspecified tibia, initial encounter for closed fracture 01/27/2012     Surgical History  Past Surgical History:   Procedure Laterality Date    CERVICAL FUSION  09/06/2013    Cervical Vertebral Fusion    FRACTURE SURGERY  2004    LUMBAR DISCECTOMY  09/06/2013    Spinal Diskectomy    OTHER SURGICAL HISTORY  01/20/2021    Colonoscopy    OTHER SURGICAL HISTORY  03/31/2014    Acellular Dermal Replacement Ankles    WISDOM TOOTH EXTRACTION       Social History  Social History     Tobacco Use    Smoking status: Never    Smokeless tobacco: Never   Substance Use Topics    Alcohol use: Not Currently     Alcohol/week: 2.0 standard drinks of alcohol     Types: 1 Glasses of wine, 1 Cans of beer per week     Comment: Occasional wine/beer    Drug use: Never     Allergies  Penicillins and Sulfa (sulfonamide antibiotics)  Medications Prior to Admission   Medication Sig Dispense Refill Last Dose    azelastine (Astelin) 137 mcg (0.1 %) nasal spray Administer 1 spray into each nostril 2 times a day as needed for rhinitis.   Unknown    calcium carbonate-vitamin D3 (Caltrate 600 plus D) 600 mg-20 mcg (800 unit) tablet,chewable Chew 1 tablet once daily.   Unknown    clonazePAM (KlonoPIN) 0.5 mg tablet Take 1 tablet (0.5 mg) by mouth once daily.   Unknown    EnbreL SureClick 50 mg/mL (1 mL) pen injector pen injection Inject 1 mL (50 mg) under the skin. Every 10  "days   Unknown    rosuvastatin (Crestor) 5 mg tablet Take 1 tablet (5 mg) by mouth every other day.   Unknown    sertraline (Zoloft) 100 mg tablet Take 1 tablet (100 mg) by mouth once daily.   Unknown    traZODone (Desyrel) 50 mg tablet Take 3 tablets (150 mg) by mouth once daily at bedtime.   Unknown       Review of Systems  Neurological Exam  Physical Exam    Gen: NAD  Neuro:  --HIF: Awake alert, follows only some commands intermittently; she knew the year but not the month; she was unable to answer or location; she was unable name or repeat objects; she continuously speaks about her  and 'infinity.'    --CN:  PERRLA, EOM grossly intact, VFF full to threat, no facial droop  --Motor: Moves all 4 extremities equally; no focal deficits  --Sensory: Intact painful stimulation  --Reflex: 2+ symmetric, toes down  --Cerebellum: unable to follow commands  --Gait: Deferred     Last Recorded Vitals  Blood pressure 173/90, pulse 61, temperature 36.6 °C (97.9 °F), temperature source Temporal, resp. rate 16, height 1.676 m (5' 6\"), weight 75.2 kg (165 lb 12.6 oz), SpO2 99%.    Relevant Results                                      I have personally reviewed the following imaging results XR chest 1 view    Result Date: 10/9/2024  STUDY: Chest Radiograph;  10/9/2024 INDICATION: Altered mental status. COMPARISON: None Available ACCESSION NUMBER(S): [id number] ORDERING CLINICIAN: ALPA KELLY TECHNIQUE:  Frontal chest was obtained at 02:51 hours. FINDINGS: CARDIOMEDIASTINAL SILHOUETTE: Cardiomediastinal silhouette is mildly enlarged in size and configuration.  LUNGS: Lungs are clear.  ABDOMEN: No remarkable upper abdominal findings.  BONES: No acute osseous changes.    No acute pulmonary abnormality. Signed by Lan Velasquez MD    CT head wo IV contrast    Result Date: 10/9/2024  Interpreted By:  Adi Tobin, STUDY: CT HEAD WO IV CONTRAST;  10/9/2024 2:59 am   INDICATION: Signs/Symptoms:AMS.     COMPARISON: " 07/04/2016 CT head without contrast   ACCESSION NUMBER(S): [id number]   ORDERING CLINICIAN: ALPA KELLY   TECHNIQUE: Noncontrast axial CT images of head were obtained with coronal and sagittal reconstructed images.   FINDINGS: BRAIN PARENCHYMA: There are 3 D brain stimulator probes, 2 on the right and 1 on the left, all of which terminating near the region of the bilateral subthalamic nuclei. No acute intraparenchymal hemorrhage or parenchymal evidence of acute large territory ischemic infarct. Gray-white matter distinction is preserved. No mass-effect.   VENTRICLES and EXTRA-AXIAL SPACES:  No acute extra-axial or intraventricular hemorrhage. No effacement of cerebral sulci. The ventricles and sulci are age-concordant.   PARANASAL SINUSES/MASTOIDS:  No hemorrhage or air-fluid levels within the visualized paranasal sinuses. The mastoids are well aerated.   CALVARIUM/ORBITS: There are 2 right frontal sean holes and a left frontal sean hole for deep brain stimulating probe access. No skull fracture.  The orbits and globes are intact to the extent visualized.   EXTRACRANIAL SOFT TISSUES: No discernible abnormality.       No acute intracranial abnormality.   Bilateral deep brain stimulating probes, 2 on the right and 1 on the left, all of which terminate near the subthalamic nuclei.   MACRO: None.   Signed by: Adi Tobin 10/9/2024 3:06 AM Dictation workstation:   [id number]    XR SINUS 3V PA/WATER/LAT    Result Date: 9/26/2024  * * *Final Report* * * DATE OF EXAM: Sep 23 2024 11:03AM   CRX   5602  -  XR SINUS 3V PA/FRANK/LAT  / ACCESSION #  [id number] PROCEDURE REASON: Tenderness over maxillary sinus      * * * * Physician Interpretation * * * *  EXAMINATION:  XR SINUS 3V PA/FRANK/LAT CLINICAL HISTORY:  Tenderness over maxillary sinus Technique:   XR SINUS 3V PA/FRANK/LAT -- NOT APPLICABLE with 3 views on 5 images Comparison: None RESULT: No acute fracture or destructive osseous lesion.  No mucoperiosteal  thickening or fluid in the paranasal sinuses.    IMPRESSION: No acute osseous abnormality : DIANA   Transcribe Date/Time: Sep 26 2024 10:12A Dictated by : SEVERINO HART MD This examination was interpreted and the report reviewed and electronically signed by: SEVERINO HART MD on Sep 26 2024 10:13AM  EST       Assessment/Plan   Assessment & Plan  Altered mental status, unspecified altered mental status type      This is a 64 year old female presenting for evaluation of altered mental status.  The patient has cerebral palsy.  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     Two weeks ago, she moved to a different house.  Ever since, she developed altered mental status - characterized by compulsive behavior followed by tangential thoughts and now what appears to be delusion and nonsensical speech.  Her examination is limited.  Her neck is supple.  She is awake, alert, follows commands intermittently.  She is oriented to the year.  She has no focal deficits but her speech is tangential and nonsensical.      Neurological ddx includes seizure (but this would seem unlikely).  Encephalitis is a consideration but doubtful as she has no fever, meningismus, etc.  Other considerations include acute psychotic episode.      Plan   - EEG  - psychiatry evaluation       I spent 60 minutes in the professional and overall care of this patient.      Marty Corrigan MD    Addendum at 2:52 PM    Patient declined EEG twice today.  Discussed case with Dr. English  Patient is apparently immunosupressed - she has RA and is on an immunosuppresant.  She also has hypogammaglobulinemia.    Recommend LP under IR (likely will need sedation)  Awaiting Psych recs.

## 2024-10-09 NOTE — NURSING NOTE
1100 Pt increasingly agitated, Dr English aware while rounding. Refusing EEG at present. Having difficulty urinating, Bladder scan for 430 ml.  1117 Lorazepam 1 mg IVP for agitation as ordered. Attempting to get out of bed, talking with confused speech but appears to be angry. Active listening offered. Refusing EEG at this time.  1140 Pt much calmer after lorazepam given. Resting in bed. Able to urinate in purewick after calming down.  1410 Psych eval at bedside.  1755 Lorazepam 1 mg IVP given for increased agitation. Pt keeps getting up on own. Asking for  Bradly, then wanting him to leave when he is at the bedside. Pt attempting to get out of bed.   1825 Patient much calmer, sleeping in bed. Injury free throughout shift.

## 2024-10-09 NOTE — H&P
"History Of Present Illness  Enid Sheffield is a 64 y.o. female with cerebral palsy w/ presence of deep brain stimulator (2020) or Genetic torsion dystonia, recurrent sinus infections w/ hypogammaglobulinemia, anxiety/depression with hx of SI, seronegative rheumatoid arthritis on Etanercept, C. Diff colitis,  HLD, HTN, presenting with AMS.  Much of the history was provided by the  who is present at bedside during the time my interview.  He states that him and his wife moved to a new house about 2 weeks ago.  Since then, she has been exhibiting several new and strange behaviors such as obsessively organizing and cleaning the house in which she stays up and does not sleep in order to do so.  He states that she appears to have been exhibiting some hallucinations as well.  She has had a significant change in her speech, exhibiting a significant dysarthria now.  She also has been exhibiting repetitive hand motions and hand signaling with unclear purpose.  He notes that all of the symptoms have progressively worsened over the preceding 2 weeks and have been markedly worse over the last 2 to 3 days. In addition, EMS report stated that the patient was alert and oriented x 2 however, as soon as the  left the room she became alert and oriented x 4 and allegedly told EMS that she was doing this \"test her \".  She has had not had any signs of illness including abdominal pain, urinary symptoms, nausea, vomiting, fever, chills, night sweats.  When questioned about whether or not anything like this is occurred prior, the  denies any such previous events.  He also denies any significant recent medication changes.  Lastly, he indicates that her baseline is largely normal mental status and that all of this is highly unusual for her.  During the course of my interview, the patient does express seem to express some understanding, if not entire comprehension of our conversation.    ED Course:  Vitals: T97 F, HR " 71, RR 17, /87, SpO2 95% on room air  Labs: CMP largely unremarkable other than K+ 3.3, troponin 8, CBC unremarkable, flu A/B-, UA relatively unremarkable  Imaging: CT head with no acute intracranial abnormalities.  CXR with no acute finding  Interventions: Potassium 40 mEq X1       Past Medical History  As above    Surgical History  She has a past surgical history that includes Lumbar discectomy (09/06/2013); Cervical fusion (09/06/2013); Other surgical history (01/20/2021); Other surgical history (03/31/2014); [place] tooth extraction; and Fracture surgery (2004).  DBS placement 2020.      Social History  Noncontributory.     Family History  Family History   Problem Relation Name Age of Onset    Diabetes Mother Ma     Other (abdominal aortic aneurysm) Mother Ma     Thyroid disease Mother Ma     Hearing loss Mother Ma     Hypertension Mother Ma     Miscarriages / Stillbirths Mother Ma     Ovarian cancer Mother Ma     Hypertension Sister K6     Other (brain tumor) Sister K6     Diabetes Sister K6     Stroke Sister K6     Tremor Son JK     Mental illness Son JK     Breast cancer Father's Sister BK     Cancer Father's Sister BK     Breast cancer Maternal Grandmother Bev     Hearing loss Father Pa     Hypertension Father Pa     Cancer Sister Kk2     Cancer Sister KK3     Hearing loss Sister KK3     Cancer Father's Sister HARVEY     Miscarriages / Stillbirths Sister KK6         Allergies  Penicillins and Sulfa (sulfonamide antibiotics)     Physical Exam  Constitutional:       General: She is not in acute distress.     Appearance: Normal appearance. She is not ill-appearing or toxic-appearing.   HENT:      Head: Normocephalic and atraumatic.   Eyes:      Extraocular Movements: Extraocular movements intact.   Cardiovascular:      Rate and Rhythm: Normal rate and regular rhythm.      Pulses:           Radial pulses are 2+ on the right side and 2+ on the left side.        Posterior tibial pulses are 2+ on the right  side and 2+ on the left side.      Heart sounds: Normal heart sounds. No murmur heard.  Pulmonary:      Effort: Pulmonary effort is normal. No respiratory distress.      Breath sounds: Normal breath sounds.   Musculoskeletal:      Right lower leg: No edema.      Left lower leg: No edema.   Skin:     General: Skin is warm and dry.   Neurological:      General: No focal deficit present.      Mental Status: She is alert and oriented to person, place, and time. Mental status is at baseline.      Motor: No weakness.      Comments: Strength and sensation grossly intact.  Patient able to follow commands appropriately.  Very notable repetitive hand flapping and circular motions during conversation.   Psychiatric:         Mood and Affect: Mood normal.         Thought Content: Thought content normal.          Last Recorded Vitals  /81   Pulse 71   Temp 36.1 °C (97 °F)   Resp 18   SpO2 95%     Relevant Results  Results for orders placed or performed during the hospital encounter of 10/09/24 (from the past 24 hour(s))   CBC and Auto Differential   Result Value Ref Range    WBC 8.2 4.4 - 11.3 x10*3/uL    nRBC 0.0 0.0 - 0.0 /100 WBCs    RBC 4.74 4.00 - 5.20 x10*6/uL    Hemoglobin 13.8 12.0 - 16.0 g/dL    Hematocrit 40.7 36.0 - 46.0 %    MCV 86 80 - 100 fL    MCH 29.1 26.0 - 34.0 pg    MCHC 33.9 32.0 - 36.0 g/dL    RDW 11.8 11.5 - 14.5 %    Platelets 192 150 - 450 x10*3/uL    Neutrophils % 64.7 40.0 - 80.0 %    Immature Granulocytes %, Automated 0.1 0.0 - 0.9 %    Lymphocytes % 24.0 13.0 - 44.0 %    Monocytes % 7.4 2.0 - 10.0 %    Eosinophils % 3.3 0.0 - 6.0 %    Basophils % 0.5 0.0 - 2.0 %    Neutrophils Absolute 5.28 1.20 - 7.70 x10*3/uL    Immature Granulocytes Absolute, Automated 0.01 0.00 - 0.70 x10*3/uL    Lymphocytes Absolute 1.96 1.20 - 4.80 x10*3/uL    Monocytes Absolute 0.60 0.10 - 1.00 x10*3/uL    Eosinophils Absolute 0.27 0.00 - 0.70 x10*3/uL    Basophils Absolute 0.04 0.00 - 0.10 x10*3/uL   Magnesium    Result Value Ref Range    Magnesium 1.98 1.60 - 2.40 mg/dL   Comprehensive metabolic panel   Result Value Ref Range    Glucose 140 (H) 74 - 99 mg/dL    Sodium 142 136 - 145 mmol/L    Potassium 3.3 (L) 3.5 - 5.3 mmol/L    Chloride 107 98 - 107 mmol/L    Bicarbonate 26 21 - 32 mmol/L    Anion Gap 12 10 - 20 mmol/L    Urea Nitrogen 12 6 - 23 mg/dL    Creatinine 0.83 0.50 - 1.05 mg/dL    eGFR 79 >60 mL/min/1.73m*2    Calcium 8.8 8.6 - 10.3 mg/dL    Albumin 4.2 3.4 - 5.0 g/dL    Alkaline Phosphatase 71 33 - 136 U/L    Total Protein 6.1 (L) 6.4 - 8.2 g/dL    AST 21 9 - 39 U/L    Bilirubin, Total 0.8 0.0 - 1.2 mg/dL    ALT 16 7 - 45 U/L   Lipase   Result Value Ref Range    Lipase 38 9 - 82 U/L   Lactate   Result Value Ref Range    Lactate 2.0 0.4 - 2.0 mmol/L   Troponin I, High Sensitivity, Initial   Result Value Ref Range    Troponin I, High Sensitivity 8 0 - 13 ng/L   Sars-CoV-2 PCR   Result Value Ref Range    Coronavirus 2019, PCR Not Detected Not Detected   Influenza A, and B PCR   Result Value Ref Range    Flu A Result Not Detected Not Detected    Flu B Result Not Detected Not Detected   Lavender Top   Result Value Ref Range    Extra Tube Hold for add-ons.    Urinalysis with Reflex Culture and Microscopic   Result Value Ref Range    Color, Urine Yellow Light-Yellow, Yellow, Dark-Yellow    Appearance, Urine Clear Clear    Specific Gravity, Urine 1.021 1.005 - 1.035    pH, Urine 6.0 5.0, 5.5, 6.0, 6.5, 7.0, 7.5, 8.0    Protein, Urine 20 (TRACE) NEGATIVE, 10 (TRACE), 20 (TRACE) mg/dL    Glucose, Urine Normal Normal mg/dL    Blood, Urine 0.2 (2+) (A) NEGATIVE    Ketones, Urine NEGATIVE NEGATIVE mg/dL    Bilirubin, Urine NEGATIVE NEGATIVE    Urobilinogen, Urine 2 (1+) (A) Normal mg/dL    Nitrite, Urine NEGATIVE NEGATIVE    Leukocyte Esterase, Urine NEGATIVE NEGATIVE   Urinalysis Microscopic   Result Value Ref Range    WBC, Urine 1-5 1-5, NONE /HPF    RBC, Urine 6-10 (A) NONE, 1-2, 3-5 /HPF    Budding Yeast, Urine PRESENT  (A) NONE /HPF    Mucus, Urine 3+ Reference range not established. /LPF    Hyaline Casts, Urine 2+ (A) NONE /LPF   Troponin, High Sensitivity, 1 Hour   Result Value Ref Range    Troponin I, High Sensitivity 8 0 - 13 ng/L     CT head wo IV contrast    Result Date: 10/9/2024  Interpreted By:  Adi Tobin, STUDY: CT HEAD WO IV CONTRAST;  10/9/2024 2:59 am   INDICATION: Signs/Symptoms:AMS.     COMPARISON: 07/04/2016 CT head without contrast   ACCESSION NUMBER(S): [id number]   ORDERING CLINICIAN: ALPA KELLY   TECHNIQUE: Noncontrast axial CT images of head were obtained with coronal and sagittal reconstructed images.   FINDINGS: BRAIN PARENCHYMA: There are 3 D brain stimulator probes, 2 on the right and 1 on the left, all of which terminating near the region of the bilateral subthalamic nuclei. No acute intraparenchymal hemorrhage or parenchymal evidence of acute large territory ischemic infarct. Gray-white matter distinction is preserved. No mass-effect.   VENTRICLES and EXTRA-AXIAL SPACES:  No acute extra-axial or intraventricular hemorrhage. No effacement of cerebral sulci. The ventricles and sulci are age-concordant.   PARANASAL SINUSES/MASTOIDS:  No hemorrhage or air-fluid levels within the visualized paranasal sinuses. The mastoids are well aerated.   CALVARIUM/ORBITS: There are 2 right frontal sean holes and a left frontal sean hole for deep brain stimulating probe access. No skull fracture.  The orbits and globes are intact to the extent visualized.   EXTRACRANIAL SOFT TISSUES: No discernible abnormality.       No acute intracranial abnormality.   Bilateral deep brain stimulating probes, 2 on the right and 1 on the left, all of which terminate near the subthalamic nuclei.   MACRO: None.   Signed by: Adi Tobin 10/9/2024 3:06 AM Dictation workstation:   [id number]        Assessment/Plan   Enid Sheffield is a 64 y.o. female with cerebral palsy w/ presence of deep brain stimulator (2020) or Genetic  torsion dystonia, recurrent sinus infections w/ hypogammaglobulinemia, anxiety/depression with hx of SI, seronegative rheumatoid arthritis on Etanercept, C. Diff colitis,  HLD, HTN, presenting with AMS.  Given patient's anxiety/depression history in conjunction with the fact that she has a deep brain stimulator in place, high suspicion that this could represent a combination of both acute stress disorder that is being exacerbated by the presence of DBS (can predispose to augustina, psychosis, among others).  She is otherwise stable in the ED with a very low suspicion for any type of infection.  UA is largely unremarkable and chest x-ray is unremarkable.  DDx includes, but is not limited to, seizure activity, infection, DBS psychosis, polypharmacy, vitamin deficiency, acute stress disorder, or combination of all the above.  Will admit and consult neurology and psychiatry.    #Altered mental status  #Cerebral palsy with presence of deep brain stimulator  -Per HPI, patient presentation is significant deviation from her baseline.  Notably the patient is alert and oriented x 4 on my initial examination.  She does seem to exhibit understanding of my conversation with her  quite well.  The most marked deficit that she is exhibiting is her inability to express herself well in complete sentences  -High suspicion that acute stress disorder in conjunction with presence of DBS may explain many, if not all of her symptoms  -Testing for other organic causes: TSH, ammonia, B12, folate, thiamine, niacin, vitamin D, ordered and pending  -Suspicion for polypharmacy is quite low.  Dispense history reviewed, no obvious inciting pharmacologic agent  -Neurology consulted, appreciate recs  -Psychiatry consulted, appreciate recs    #Anxiety  #Depression  -See above  -Continue home meds as appropriate    Chronic problems:  #Seronegative RA  #HLD  #HTN  -Continue home meds as appropriate      Diet: Regular  DVT ppx: Lovenox  Code Status:  DNR/DNI     Plan preliminary until cosigned by attending physician.    Waqas Anand D.O.   Family Medicine PGY-2, [facility]  10/09/24    -------------------------  Attending Addendum  -------------------------    Seen and examined with resident physicians.  Labs and imaging personally reviewed.     64-year-old female with past medical history of cerebral palsy as well as torsional dystonia status post deep brain stimulator in 2020, seronegative rheumatoid arthritis on etanercept with hypogammaglobulinemia with recurrent sinus infections who presented from home with around 2 weeks of manic type symptoms including obsessively organizing things around the house and cleaning and not sleeping.  This is progressed over the last 2 to 3 days over which time she has been having hallucinations, speaking nonsensical statements however no dysarthria or apparent aphasia.  She has been  perseverating on specific topics and on my exam has some delusions of persecution, she appears to think medical personnel in the ED room with her during my interview with her  in the room are threatening and is closing her eyes and asking them to leave and close the door behind them.  She is asking for her  Bradly who is at bedside.  She is perseverating on the number 3, as well as the concept of Infinity.  She is moving all limbs, no facial asymmetry, pupils are equal round and reactive to light, no diaphoresis, no fevers, well-perfused.   at bedside says that she has otherwise been in her normal state of health up until this point other than sinus congestion, sore throat and a runny nose.  He specifically denies any nausea, vomiting, fevers, chills, sweats that he is aware of.    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  No inciting event cannot be elucidated other than her being under a lot of stress recently after moving residences.  No prior history of psychoses or bipolar disorder.  Will pursue a broad workup, will begin with EEG and serum testing for cause of her encephalitis including ESR/CRP to help determine if this is inflammatory rather than anatomical/psychiatric.  Pending these results we will determine further workup, possibly LP as she is unable to receive an MRI given her deep brain stimulator.  Neurology evaluated today, planning for EEG which will be performed today, psychiatry evaluation pending.    Case discussed with case management, residents and patient.    Complexity: High    I saw and evaluated the patient. I personally obtained the key and critical portions of the history and physical exam or was physically present for key and critical portions performed by the resident/fellow. I reviewed the resident/fellow's documentation and discussed the patient with the resident/fellow. I agree with the resident/fellow's medical decision making as documented in the note.    Domenic English, DO

## 2024-10-09 NOTE — PROGRESS NOTES
10/09/24 1004   Discharge Planning   Living Arrangements Spouse/significant other   Support Systems Spouse/significant other   Assistance Needed none   Type of Residence Private residence   Home or Post Acute Services None   Expected Discharge Disposition Home   Does the patient need discharge transport arranged? No   RoundTrip coordination needed? No     Met with patient and patient's spouse Bradly at bedside. Introduced myself and role. Patient covers her hands over her face and will not participate in assessment. Information obtained from patient's spouse. Patient lives at home with spouse. He states, at baseline she is independent, no use of DME and drives. PCP is Sandie Marin. He confirms that they understand how to manage patient's health at home and of her home medications. At this time, plan is for patient to return home when medically ready for d/c. Psychiatry has been consulted. Care transitions team to follow for additional d/c planning needs.

## 2024-10-09 NOTE — CARE PLAN
The patient's goals for the shift include      The clinical goals for the shift include        Problem: Skin  Goal: Decreased wound size/increased tissue granulation at next dressing change  Outcome: Progressing  Goal: Participates in plan/prevention/treatment measures  Outcome: Progressing  Goal: Prevent/manage excess moisture  Outcome: Progressing  Goal: Prevent/minimize sheer/friction injuries  Outcome: Progressing  Goal: Promote/optimize nutrition  Outcome: Progressing  Goal: Promote skin healing  Outcome: Progressing     Problem: Fall/Injury  Goal: Verbalize understanding of personal risk factors for fall in the hospital  Outcome: Progressing  Goal: Verbalize understanding of risk factor reduction measures to prevent injury from fall in the home  Outcome: Progressing     Problem: Pain - Adult  Goal: Verbalizes/displays adequate comfort level or baseline comfort level  Outcome: Progressing     Problem: Safety - Adult  Goal: Free from fall injury  Outcome: Progressing     Problem: Discharge Planning  Goal: Discharge to home or other facility with appropriate resources  Outcome: Progressing     Problem: Chronic Conditions and Co-morbidities  Goal: Patient's chronic conditions and co-morbidity symptoms are monitored and maintained or improved  Outcome: Progressing     Problem: Respiratory  Goal: No signs of respiratory distress (eg. Use of accessory muscles. Peds grunting)  Outcome: Progressing  Goal: Wean oxygen to maintain O2 saturation per order/standard this shift  Outcome: Progressing  Goal: Increase self care and/or family involvement in next 24 hours  Outcome: Progressing     Problem: Pain  Goal: LTG - Patient will manage pain with the appropriate technique/Intervention  Outcome: Progressing  Goal: LTG - Patient will demonstrate intervention for managing pain  Outcome: Progressing  Goal: STG - Patient will reduce or eliminate use of analgesics  Outcome: Progressing  Goal: STG - Pain is manageable through  therapies  Outcome: Progressing  Goal: STG - Patient will verbalize an acceptable level of pain  Outcome: Progressing  Goal: STG - Patients pain is managed to allow active participation in daily activities  Outcome: Progressing  Goal: STG - Patient will increase activity level  Outcome: Progressing  Goal: STG - Patient verbalizes a reduction in pain level  Outcome: Progressing     Problem: Fall/Injury  Goal: Not fall by end of shift  Outcome: Met  Goal: Be free from injury by end of the shift  Outcome: Met  Goal: Use assistive devices by end of the shift  Outcome: Met  Goal: Pace activities to prevent fatigue by end of the shift  Outcome: Met     Problem: Respiratory  Goal: Clear secretions with interventions this shift  Outcome: Met  Goal: Minimize anxiety/maximize coping throughout shift  Outcome: Met  Goal: Minimal/no exertional discomfort or dyspnea this shift  Outcome: Met  Goal: Patent airway maintained this shift  Outcome: Met  Goal: Tolerate mechanical ventilation evidenced by VS/agitation level this shift  Outcome: Met  Goal: Tolerate pulmonary toileting this shift  Outcome: Met  Goal: Verbalize decreased shortness of breath this shift  Outcome: Met

## 2024-10-10 ENCOUNTER — HOSPITAL ENCOUNTER (OUTPATIENT)
Dept: CARDIOLOGY | Facility: HOSPITAL | Age: 64
Discharge: HOME | End: 2024-10-10
Payer: COMMERCIAL

## 2024-10-10 ENCOUNTER — APPOINTMENT (OUTPATIENT)
Dept: RADIOLOGY | Facility: HOSPITAL | Age: 64
End: 2024-10-10
Payer: COMMERCIAL

## 2024-10-10 ENCOUNTER — ANESTHESIA (OUTPATIENT)
Dept: RADIOLOGY | Facility: HOSPITAL | Age: 64
End: 2024-10-10
Payer: COMMERCIAL

## 2024-10-10 ENCOUNTER — ANESTHESIA EVENT (OUTPATIENT)
Dept: RADIOLOGY | Facility: HOSPITAL | Age: 64
End: 2024-10-10
Payer: COMMERCIAL

## 2024-10-10 PROBLEM — R41.82 ALTERED MENTAL STATUS, UNSPECIFIED: Status: ACTIVE | Noted: 2024-10-10

## 2024-10-10 LAB
ALBUMIN SERPL BCP-MCNC: 4 G/DL (ref 3.4–5)
AMPHETAMINES UR QL SCN: ABNORMAL
ANION GAP SERPL CALC-SCNC: 10 MMOL/L (ref 10–20)
BARBITURATES UR QL SCN: ABNORMAL
BASOPHILS # BLD AUTO: 0.04 X10*3/UL (ref 0–0.1)
BASOPHILS NFR BLD AUTO: 0.7 %
BENZODIAZ UR QL SCN: ABNORMAL
BUN SERPL-MCNC: 9 MG/DL (ref 6–23)
BZE UR QL SCN: ABNORMAL
CALCIUM SERPL-MCNC: 8.8 MG/DL (ref 8.6–10.3)
CANNABINOIDS UR QL SCN: ABNORMAL
CHLORIDE SERPL-SCNC: 106 MMOL/L (ref 98–107)
CO2 SERPL-SCNC: 28 MMOL/L (ref 21–32)
CREAT SERPL-MCNC: 0.67 MG/DL (ref 0.5–1.05)
EGFRCR SERPLBLD CKD-EPI 2021: >90 ML/MIN/1.73M*2
EOSINOPHIL # BLD AUTO: 0.1 X10*3/UL (ref 0–0.7)
EOSINOPHIL NFR BLD AUTO: 1.8 %
ERYTHROCYTE [DISTWIDTH] IN BLOOD BY AUTOMATED COUNT: 12 % (ref 11.5–14.5)
FENTANYL+NORFENTANYL UR QL SCN: ABNORMAL
GLUCOSE SERPL-MCNC: 146 MG/DL (ref 74–99)
HCT VFR BLD AUTO: 40.2 % (ref 36–46)
HGB BLD-MCNC: 13.5 G/DL (ref 12–16)
HOLD SPECIMEN: NORMAL
IMM GRANULOCYTES # BLD AUTO: 0.01 X10*3/UL (ref 0–0.7)
IMM GRANULOCYTES NFR BLD AUTO: 0.2 % (ref 0–0.9)
LYMPHOCYTES # BLD AUTO: 1.15 X10*3/UL (ref 1.2–4.8)
LYMPHOCYTES NFR BLD AUTO: 20.6 %
MAGNESIUM SERPL-MCNC: 2.09 MG/DL (ref 1.6–2.4)
MCH RBC QN AUTO: 29.5 PG (ref 26–34)
MCHC RBC AUTO-ENTMCNC: 33.6 G/DL (ref 32–36)
MCV RBC AUTO: 88 FL (ref 80–100)
METHADONE UR QL SCN: ABNORMAL
MONOCYTES # BLD AUTO: 0.45 X10*3/UL (ref 0.1–1)
MONOCYTES NFR BLD AUTO: 8.1 %
NEUTROPHILS # BLD AUTO: 3.83 X10*3/UL (ref 1.2–7.7)
NEUTROPHILS NFR BLD AUTO: 68.6 %
NRBC BLD-RTO: 0 /100 WBCS (ref 0–0)
OPIATES UR QL SCN: ABNORMAL
OXYCODONE+OXYMORPHONE UR QL SCN: ABNORMAL
PCP UR QL SCN: ABNORMAL
PHOSPHATE SERPL-MCNC: 3.2 MG/DL (ref 2.5–4.9)
PLATELET # BLD AUTO: 160 X10*3/UL (ref 150–450)
POTASSIUM SERPL-SCNC: 3.4 MMOL/L (ref 3.5–5.3)
RBC # BLD AUTO: 4.57 X10*6/UL (ref 4–5.2)
SODIUM SERPL-SCNC: 141 MMOL/L (ref 136–145)
WBC # BLD AUTO: 5.6 X10*3/UL (ref 4.4–11.3)

## 2024-10-10 PROCEDURE — 80069 RENAL FUNCTION PANEL: CPT

## 2024-10-10 PROCEDURE — 85025 COMPLETE CBC W/AUTO DIFF WBC: CPT

## 2024-10-10 PROCEDURE — 2500000002 HC RX 250 W HCPCS SELF ADMINISTERED DRUGS (ALT 637 FOR MEDICARE OP, ALT 636 FOR OP/ED): Performed by: STUDENT IN AN ORGANIZED HEALTH CARE EDUCATION/TRAINING PROGRAM

## 2024-10-10 PROCEDURE — 2500000004 HC RX 250 GENERAL PHARMACY W/ HCPCS (ALT 636 FOR OP/ED)

## 2024-10-10 PROCEDURE — 80307 DRUG TEST PRSMV CHEM ANLYZR: CPT

## 2024-10-10 PROCEDURE — 99233 SBSQ HOSP IP/OBS HIGH 50: CPT | Performed by: PSYCHIATRY & NEUROLOGY

## 2024-10-10 PROCEDURE — 83735 ASSAY OF MAGNESIUM: CPT

## 2024-10-10 PROCEDURE — G0378 HOSPITAL OBSERVATION PER HR: HCPCS

## 2024-10-10 PROCEDURE — 99239 HOSP IP/OBS DSCHRG MGMT >30: CPT | Performed by: STUDENT IN AN ORGANIZED HEALTH CARE EDUCATION/TRAINING PROGRAM

## 2024-10-10 PROCEDURE — 2500000002 HC RX 250 W HCPCS SELF ADMINISTERED DRUGS (ALT 637 FOR MEDICARE OP, ALT 636 FOR OP/ED)

## 2024-10-10 PROCEDURE — 99231 SBSQ HOSP IP/OBS SF/LOW 25: CPT | Performed by: NURSE PRACTITIONER

## 2024-10-10 PROCEDURE — 93005 ELECTROCARDIOGRAM TRACING: CPT

## 2024-10-10 PROCEDURE — 99232 SBSQ HOSP IP/OBS MODERATE 35: CPT | Performed by: PSYCHIATRY & NEUROLOGY

## 2024-10-10 PROCEDURE — 36415 COLL VENOUS BLD VENIPUNCTURE: CPT

## 2024-10-10 PROCEDURE — 2500000001 HC RX 250 WO HCPCS SELF ADMINISTERED DRUGS (ALT 637 FOR MEDICARE OP)

## 2024-10-10 PROCEDURE — 2500000001 HC RX 250 WO HCPCS SELF ADMINISTERED DRUGS (ALT 637 FOR MEDICARE OP): Performed by: PSYCHIATRY & NEUROLOGY

## 2024-10-10 RX ORDER — OLANZAPINE 5 MG/1
5 TABLET, ORALLY DISINTEGRATING ORAL NIGHTLY
Start: 2024-10-10

## 2024-10-10 RX ORDER — DIVALPROEX SODIUM 250 MG/1
250 TABLET, DELAYED RELEASE ORAL EVERY 8 HOURS SCHEDULED
Status: DISCONTINUED | OUTPATIENT
Start: 2024-10-10 | End: 2024-10-11 | Stop reason: HOSPADM

## 2024-10-10 RX ORDER — POTASSIUM CHLORIDE 20 MEQ/1
40 TABLET, EXTENDED RELEASE ORAL 2 TIMES DAILY
Status: COMPLETED | OUTPATIENT
Start: 2024-10-10 | End: 2024-10-10

## 2024-10-10 RX ORDER — SERTRALINE HYDROCHLORIDE 100 MG/1
100 TABLET, FILM COATED ORAL DAILY
Status: DISCONTINUED | OUTPATIENT
Start: 2024-10-10 | End: 2024-10-11 | Stop reason: HOSPADM

## 2024-10-10 RX ORDER — ROSUVASTATIN CALCIUM 5 MG/1
5 TABLET, COATED ORAL EVERY OTHER DAY
Status: DISCONTINUED | OUTPATIENT
Start: 2024-10-11 | End: 2024-10-11 | Stop reason: HOSPADM

## 2024-10-10 RX ORDER — DIVALPROEX SODIUM 250 MG/1
250 TABLET, DELAYED RELEASE ORAL EVERY 8 HOURS SCHEDULED
Start: 2024-10-10

## 2024-10-10 ASSESSMENT — COGNITIVE AND FUNCTIONAL STATUS - GENERAL
DAILY ACTIVITIY SCORE: 19
DRESSING REGULAR UPPER BODY CLOTHING: A LITTLE
TOILETING: A LITTLE
WALKING IN HOSPITAL ROOM: A LITTLE
CLIMB 3 TO 5 STEPS WITH RAILING: A LITTLE
MOVING TO AND FROM BED TO CHAIR: A LITTLE
DRESSING REGULAR LOWER BODY CLOTHING: A LITTLE
STANDING UP FROM CHAIR USING ARMS: A LITTLE
PERSONAL GROOMING: A LITTLE
MOVING FROM LYING ON BACK TO SITTING ON SIDE OF FLAT BED WITH BEDRAILS: A LITTLE
TURNING FROM BACK TO SIDE WHILE IN FLAT BAD: A LITTLE
HELP NEEDED FOR BATHING: A LITTLE
MOBILITY SCORE: 18

## 2024-10-10 ASSESSMENT — PAIN SCALES - GENERAL: PAINLEVEL_OUTOF10: 0 - NO PAIN

## 2024-10-10 NOTE — NURSING NOTE
Patient stable this shift. No behaviors noted this shift, patient able to follow directions. Patient up to restroom stand by assist. [facility],  contacted and updated. Patient currently in bed resting, bed alarm on,  call light within reach.

## 2024-10-10 NOTE — CARE PLAN
The patient's goals for the shift include      The clinical goals for the shift include see poc    Patient alert and oriented with lapses in judgement. Patient unsteady on her feet and impulsive. Patient undressed herself several times this shift and exposed herself attempting to run down the halls. Call placed to  this shift per patient request and  not able to calm patient down. MD also spoke to  for care update as well. Patient given haldol this shift and placed in restraints. Patient verbally aggressive with staff and shoved staff members several times trying to leave. Patient attempted to leave naked without shoes, clothes or socks several times and no ride. Patient re-oriented several times and observed counting and saying her abc's over and over. Patient refused HS meds this shift and vitals several times

## 2024-10-10 NOTE — NURSING NOTE
Patient paranoid and refusing to take her hs meds. Patient wanted to speak to  regarding meds. Call placed to  & message left that his wife would like to speak to him. Patient unsteady impulsive and argumentive and mistrustful of staff

## 2024-10-10 NOTE — NURSING NOTE
Patient running down the [facility] without underware and socks. Patient has no ride home and unsteady attempting to leave. MD in [facility] with patient attempting to calm her down. Several staff members in [facility] to assist patient back to the room

## 2024-10-10 NOTE — NURSING NOTE
Patient sitting on toilet in bathroom refusing to get up.  Present and trying to speak with patient as well; patient continually stating she does not trust staff that she only trusts her  and Father Bishop. Patient placed back into bed by staff, and then she got right back up was trying to walk out of the room, was getting in bedside nurse face and then placed herself on ground on hands and knees and was trying to crawl away. Patient placed back into bed again by staff and security and order was placed to put patient in x4 soft restraints for her safety.

## 2024-10-10 NOTE — SIGNIFICANT EVENT
Application for Emergency Admission      Ready for Transfer?  Is the patient medically cleared for transfer to inpatient psychiatry: Yes  Has the patient been accepted to an inpatient psychiatric hospital: Yes    Application for Emergency Admission  IN ACCORDANCE WITH [place]  The Chief Clinical Officer of: [facility]  10/10/2024 .6:38 PM    Reason for Hospitalization  The undersigned has reason to believe that: Enid Sheffield Is a mentally ill person subject to hospitalization by court order under division B Section 5122.01 of the Revised Code, i.e., this person:    1.No  Represents a substantial risk of physical harm to self as manifested by evidence of threats of, or attempts at, suicide or serious self-inflicted bodily harm    2.No Represents a substantial risk of physical harm to others as manifested by evidence of recent homicidal or other violent behavior, evidence of recent threats that place another in reasonable fear of violent behavior and serious physical harm, or other evidence of present dangerousness    3.Yes Represents a substantial and immediate risk of serious physical impairment or injury to self as manifested by  evidence that the person is unable to provide for and is not providing for the person's basic physical needs because of the person's mental illness and that appropriate provision for those needs cannot be made  immediately available in the community    4.Yes Would benefit from treatment in a hospital for his mental illness and is in need of such treatment as manifested by evidence of behavior that creates a grave and imminent risk to substantial rights of others or  himself.    5.No Would benefit from treatment as manifested by evidence of behavior that indicates all of the following:       (a) The person is unlikely to survive safely in the community without supervision, based on a clinical determination.       (b) The person has a history of lack of compliance with  treatment for mental illness and one of the following applies:      (i) At least twice within the thirty-six months prior to the filing of an affidavit seeking court-ordered treatment of the person under section 5122.111 of the Revised Code, the lack of compliance has been a significant factor in necessitating hospitalization in a hospital or receipt of services in a forensic or other mental health unit of a correctional facility, provided that the thirty-six-month period shall be extended by the length of any hospitalization or incarceration of the person that occurred within the thirty-six-month period.      (ii) Within the forty-eight months prior to the filing of an affidavit seeking court-ordered treatment of the person under section 5122.111 of the Revised Code, the lack of compliance resulted in one or more acts of serious violent behavior toward self or others or threats of, or attempts at, serious physical harm to self or others, provided that the forty-eight-month period shall be extended by the length of any hospitalization or incarceration of the person that occurred within the forty-eight-month period.      (c) The person, as a result of mental illness, is unlikely to voluntarily participate in necessary treatment.       (d) In view of the person's treatment history and current behavior, the person is in need of treatment in order to prevent a relapse or deterioration that would be likely to result in substantial risk of serious harm to the person or others.    (e) Represents a substantial risk of physical harm to self or others if allowed to remain at liberty pending examination.    Therefore, it is requested that said person be admitted to the above named facility.    STATEMENT OF BELIEF    Must be filled out by one of the following: a psychiatrist, licensed physician, licensed clinical psychologist, health or ,  or .  (Statement shall include the circumstances under  which the individual was taken into custody and the reason for the person's belief that hospitalization is necessary. The statement shall also include a reference to efforts made to secure the individual's property at his residence if he was taken into custody there. Every reasonable and appropriate effort should be made to take this person into custody in the least conspicuous manner possible.)    Presented with acute psychotic episode improving with antipsychotic medications evaluated by psychiatry who is recommending inpatient psychiatric treatment as she continues to remain impulsive with poor insight and judgment into her presentation, she remains somewhat paranoid and disorganized with her thought process, her mood has been labile and unpredictable, she was very agitated while inpatient trying to leave the hospital and was aggressive towards staff although not physically violent, however the symptoms are improving with antipsychotic medications and will need further inpatient psychiatric care    Melvin English DO 10/10/2024     _____________________________________________________________   [place] [facility] [place] [place]EMENT OF OBSERVATION BY PSYCHIATRIST, LICENSED PHYSICIAN, OR LICENSED CLINICAL PSYCHOLOGIST, IF APPLICABLE    Place of Observation (e.g., [facility], general hospital, office, emergency facility)  (If applicable, please complete)    Melvin English DO 10/10/2024    _____________________________________________________________

## 2024-10-10 NOTE — NURSING NOTE
Patient in the hallway again without clothing. Patient attempting to pull IV out and get dressed. Patient says she does not trust staff and needs to leave but has no ride or way to get home.  Bradly contacted and not able to calm patient down

## 2024-10-10 NOTE — SIGNIFICANT EVENT
"Capacity Assessment Tool    \"Capacity\" is the \"ability\" to make a decision.  The decision in question must be specific (one decision), relevant to a patient's current condition (appropriate), and timely (neither prospective nor retrospective).    Capacity varies based on knowledge base (explanation/understanding of clinical information), cognitive processing, acute psychiatric illness, and other clinical conditions.    In order to be deemed \"capacitated\" to make a single decision at one point in time, a patient must demonstrate all 4 of the following elements:    *Ability to consistently communicate a choice (consistent over time with adequate information)  *Ability to understand the relevant information (accurate knowledge of condition)  *Ability to appreciate the situation and its consequences (risks/benefits, pros/cons)  *Ability to reason about treatment options (without undue influence of a person or condition, eg. suicidality or acute psychosis)      Current Decision    Clinical issue:   Patient was trying to leave the hospital AMA and refusing care    Did the appropriate team address relevant information with the patient:  Yes    Date: 10/9/2024    If \"NO\" is selected for appropriate team, then please discuss with the appropriate team.  The appropriate team should be encouraged to address relevant information with the patient AND reevaluate capacity when appropriate.    Capacity Evaluation    Patient demonstrates ability to consistently communicate choice:  No     Patient demonstrates ability to understand the relevant information:  No     Patient demonstrates ability to appreciate the situation and its consequences:  No     Patient demonstrates ability to reason about treatment options:  No     If ANY of the above items are answered \"NO,\" the patient LACKS CAPACITY for that specific decision at hand, at that specific time.  Further capacity evaluations can be done as needed.      Andres Welch MD PGY-2 .- " Internal Medicine

## 2024-10-10 NOTE — CARE PLAN
Problem: Skin  Goal: Participates in plan/prevention/treatment measures  Outcome: Met  Goal: Prevent/manage excess moisture  Outcome: Met     Problem: Pain - Adult  Goal: Verbalizes/displays adequate comfort level or baseline comfort level  Outcome: Met     Problem: Discharge Planning  Goal: Discharge to home or other facility with appropriate resources  Outcome: Met   The patient's goals for the shift include      The clinical goals for the shift include care plan    Over the shift, the patient did make progress toward the following goals.

## 2024-10-10 NOTE — PROGRESS NOTES
"Enid Sheffield is a 64 y.o. female on day 1 of admission presenting with Altered mental status, unspecified altered mental status type.      Subjective   Mental status significantly improved today.  Pt alert, calm, interacting.   Spouse at bedside and updated.       Objective     Last Recorded Vitals  Blood pressure (!) 164/95, pulse 61, temperature 36.1 °C (97 °F), temperature source Temporal, resp. rate 18, height 1.676 m (5' 6\"), weight 75.2 kg (165 lb 12.6 oz), SpO2 94%.    Physical Exam  Eyes:      Extraocular Movements: Extraocular movements intact.      Pupils: Pupils are equal, round, and reactive to light.   Neurological:      Motor: Motor strength is normal.  Psychiatric:         Speech: Speech normal.       Neurological Exam  Mental Status  Awake, alert and oriented to person, place and time. Recent and remote memory are intact. Speech is normal. Language is fluent with no aphasia. Attention and concentration are normal.  Repetition and naming intact.    Cranial Nerves  CN II: Visual fields full to confrontation.  CN III, IV, VI: Extraocular movements intact bilaterally. Pupils equal round and reactive to light bilaterally.  CN V: Facial sensation is normal.  CN VII: Full and symmetric facial movement.  CN IX, X: Palate elevates symmetrically  CN XII: Tongue midline without atrophy or fasciculations.    Motor  Normal muscle bulk throughout. Normal muscle tone. Strength is 5/5 throughout all four extremities.    Sensory  Sensation is intact to light touch, pinprick, vibration and proprioception in all four extremities.    Coordination  Right: Finger-to-nose normal.Left: Finger-to-nose normal.    Relevant Results  Scheduled medications  clonazePAM, 0.5 mg, oral, Daily  enoxaparin, 40 mg, subcutaneous, q24h  OLANZapine zydis, 5 mg, oral, Nightly  polyethylene glycol, 17 g, oral, Daily  potassium chloride CR, 40 mEq, oral, BID      Continuous medications     PRN medications    Results for orders placed or " performed during the hospital encounter of 10/09/24 (from the past 24 hour(s))   Green Top   Result Value Ref Range    Extra Tube Hold for add-ons.    Lavender Top   Result Value Ref Range    Extra Tube Hold for add-ons.    SST TOP   Result Value Ref Range    Extra Tube Hold for add-ons.    SST TOP   Result Value Ref Range    Extra Tube Hold for add-ons.    PST Top   Result Value Ref Range    Extra Tube Hold for add-ons.    Sedimentation Rate   Result Value Ref Range    Sedimentation Rate 8 0 - 30 mm/h   Ammonia   Result Value Ref Range    Ammonia 28 16 - 53 umol/L   CBC and Auto Differential   Result Value Ref Range    WBC 5.6 4.4 - 11.3 x10*3/uL    nRBC 0.0 0.0 - 0.0 /100 WBCs    RBC 4.57 4.00 - 5.20 x10*6/uL    Hemoglobin 13.5 12.0 - 16.0 g/dL    Hematocrit 40.2 36.0 - 46.0 %    MCV 88 80 - 100 fL    MCH 29.5 26.0 - 34.0 pg    MCHC 33.6 32.0 - 36.0 g/dL    RDW 12.0 11.5 - 14.5 %    Platelets 160 150 - 450 x10*3/uL    Neutrophils % 68.6 40.0 - 80.0 %    Immature Granulocytes %, Automated 0.2 0.0 - 0.9 %    Lymphocytes % 20.6 13.0 - 44.0 %    Monocytes % 8.1 2.0 - 10.0 %    Eosinophils % 1.8 0.0 - 6.0 %    Basophils % 0.7 0.0 - 2.0 %    Neutrophils Absolute 3.83 1.20 - 7.70 x10*3/uL    Immature Granulocytes Absolute, Automated 0.01 0.00 - 0.70 x10*3/uL    Lymphocytes Absolute 1.15 (L) 1.20 - 4.80 x10*3/uL    Monocytes Absolute 0.45 0.10 - 1.00 x10*3/uL    Eosinophils Absolute 0.10 0.00 - 0.70 x10*3/uL    Basophils Absolute 0.04 0.00 - 0.10 x10*3/uL   Renal Function Panel   Result Value Ref Range    Glucose 146 (H) 74 - 99 mg/dL    Sodium 141 136 - 145 mmol/L    Potassium 3.4 (L) 3.5 - 5.3 mmol/L    Chloride 106 98 - 107 mmol/L    Bicarbonate 28 21 - 32 mmol/L    Anion Gap 10 10 - 20 mmol/L    Urea Nitrogen 9 6 - 23 mg/dL    Creatinine 0.67 0.50 - 1.05 mg/dL    eGFR >90 >60 mL/min/1.73m*2    Calcium 8.8 8.6 - 10.3 mg/dL    Phosphorus 3.2 2.5 - 4.9 mg/dL    Albumin 4.0 3.4 - 5.0 g/dL   Magnesium   Result Value Ref  Range    Magnesium 2.09 1.60 - 2.40 mg/dL     XR chest 1 view    Result Date: 10/9/2024  STUDY: Chest Radiograph;  10/9/2024 INDICATION: Altered mental status. COMPARISON: None Available ACCESSION NUMBER(S): [id number] ORDERING CLINICIAN: ALPA KELLY TECHNIQUE:  Frontal chest was obtained at 02:51 hours. FINDINGS: CARDIOMEDIASTINAL SILHOUETTE: Cardiomediastinal silhouette is mildly enlarged in size and configuration.  LUNGS: Lungs are clear.  ABDOMEN: No remarkable upper abdominal findings.  BONES: No acute osseous changes.    No acute pulmonary abnormality. Signed by Lan Velasquez MD    CT head wo IV contrast    Result Date: 10/9/2024  Interpreted By:  Adi Tobin, STUDY: CT HEAD WO IV CONTRAST;  10/9/2024 2:59 am   INDICATION: Signs/Symptoms:AMS.     COMPARISON: 07/04/2016 CT head without contrast   ACCESSION NUMBER(S): [id number]   ORDERING CLINICIAN: ALPA KELLY   TECHNIQUE: Noncontrast axial CT images of head were obtained with coronal and sagittal reconstructed images.   FINDINGS: BRAIN PARENCHYMA: There are 3 D brain stimulator probes, 2 on the right and 1 on the left, all of which terminating near the region of the bilateral subthalamic nuclei. No acute intraparenchymal hemorrhage or parenchymal evidence of acute large territory ischemic infarct. Gray-white matter distinction is preserved. No mass-effect.   VENTRICLES and EXTRA-AXIAL SPACES:  No acute extra-axial or intraventricular hemorrhage. No effacement of cerebral sulci. The ventricles and sulci are age-concordant.   PARANASAL SINUSES/MASTOIDS:  No hemorrhage or air-fluid levels within the visualized paranasal sinuses. The mastoids are well aerated.   CALVARIUM/ORBITS: There are 2 right frontal sean holes and a left frontal sean hole for deep brain stimulating probe access. No skull fracture.  The orbits and globes are intact to the extent visualized.   EXTRACRANIAL SOFT TISSUES: No discernible abnormality.       No acute intracranial  abnormality.   Bilateral deep brain stimulating probes, 2 on the right and 1 on the left, all of which terminate near the subthalamic nuclei.   MACRO: None.   Signed by: Adi Tobin 10/9/2024 3:06 AM Dictation workstation:   [id number]    XR SINUS 3V PA/WATER/LAT    Result Date: 9/26/2024  * * *Final Report* * * DATE OF EXAM: Sep 23 2024 11:03AM   CRX   [id number]  -  XR SINUS 3V PA/FRANK/LAT  / ACCESSION #  [id number] PROCEDURE REASON: Tenderness over maxillary sinus      * * * * Physician Interpretation * * * *  EXAMINATION:  XR SINUS 3V PA/FRANK/LAT CLINICAL HISTORY:  Tenderness over maxillary sinus Technique:   XR SINUS 3V PA/FRANK/LAT -- NOT APPLICABLE with 3 views on 5 images Comparison: None RESULT: No acute fracture or destructive osseous lesion.  No mucoperiosteal thickening or fluid in the paranasal sinuses.    IMPRESSION: No acute osseous abnormality : [facility]   Transcribe Date/Time: Sep 26 2024 10:12A Dictated by : SEVERINO HART MD This examination was interpreted and the report reviewed and electronically signed by: SEVERINO HART MD on Sep 26 2024 10:13AM  EST     Assessment/Plan      Assessment & Plan  Altered mental status, unspecified altered mental status type    Acute encephalopathy- resolved  Suspect 2/2 acute psychosis (pt evaluated by psych- see recs)    Recommend:    May cancel LP as mental status has significantly improved.  No further neurological testing needed at this time.  Follow up with own neurologist at [facility] as scheduled after discharge.    Case/plan discussed and pt seen with Dr. Corrigan.  No further needs from neurology; okay for transfer or discharge as per primary team. Please contact if condition changes for re-eval.             I spent 50 minutes in the professional and overall care of this patient.      Catrina Marcum, APRN-CNP      I saw and evaluated the patient.  I obtained the key portions of the history and examination.  I reviewed the residents/APNs  "note, discussed the patient and supervised treatment plan formulation.    Subjective:  Significantly improved today    Objective:  BP (!) 164/95 (BP Location: Right arm, Patient Position: Lying)   Pulse 61   Temp 36.1 °C (97 °F) (Temporal)   Resp 18   Ht 1.676 m (5' 6\")   Wt 75.2 kg (165 lb 12.6 oz)   SpO2 94%   BMI 26.76 kg/m²     Gen: NAD  Neuro:  --HIF: A&O X 3, repetition and naming intact  --CN:  PERRLA, EOMI, VFF, no visible facial asymmetry, facial sensation intact, no tongue or palatal deviation, SCM intact  --Motor: Moves all 4 extremities equally; no focal deficits  --Sensory: Intact to light touch, intact to pinprick  --Reflex: 2+ symmetric, toes down  --Cerebellum: FTN and HTS intact  --Gait: Deferred     Assessment:   Altered Mental Status - resolved  - likely due to manic episode  - no further neurological workup    Case discussed with Dr. English,    Marty Corrigan MD  [facility]  Department of Neurology    "

## 2024-10-10 NOTE — DISCHARGE SUMMARY
Seen and examined with resident physicians. Labs and imaging personally reviewed. Pt medically/clinically stable for discharge to inpatient psychiatry    Seen and examined with resident physicians.  Labs and imaging personally reviewed.      64-year-old female with past medical history of cerebral palsy as well as torsional dystonia status post deep brain stimulator in 2020, seronegative rheumatoid arthritis on etanercept with hypogammaglobulinemia with recurrent sinus infections who presented from home with around 2 weeks of manic type symptoms including obsessively organizing things around the house and cleaning and not sleeping.  This is progressed over the last 2 to 3 days over which time she has been having hallucinations, speaking nonsensical statements however no dysarthria or apparent aphasia.  She has been  perseverating on specific topics and on my exam has some delusions of persecution, she appears to think medical personnel in the ED room with her during my interview with her  in the room are threatening and is closing her eyes and asking them to leave and close the door behind them.  She is asking for her  Bradly who is at bedside.  She is perseverating on the number 3, as well as the concept of Infinity.  She is moving all limbs, no facial asymmetry, pupils are equal round and reactive to light, no diaphoresis, no fevers, well-perfused.   at bedside says that she has otherwise been in her normal state of health up until this point other than sinus congestion, sore throat and a runny nose.  He specifically denies any nausea, vomiting, fevers, chills, sweats that he is aware of.    Overnight following admission the patient was agitated and aggressive with staff, she was leaving her bed and running down the halls, she was administered Haldol and this morning she is very improved, she still has tangential thinking and poor insight into her disease but is more calm and cooperative on my  exam.  She does not remember much over the last few days.   is at bedside stating that this is the best her mentation has been in the last 2 or 3 days but is not quite at her baseline.  On my exam she is alert and oriented x 3, she is moving all limbs, well-perfused, no respiratory distress, no facial asymmetry     Acute manic episode given that her symptoms have markedly improved with antipsychotic medications  Immunocompromised - Seronegative rheumatoid arthritis on etanercept with hypogammaglobulinemia with recurrent sinus infections   Recent upper respiratory symptoms and sore throat  History of anxiety depression with suicidal ideation  Presence of deep brain stimulator for torsional dystonia  Cerebral palsy  Initially planned for LP given she was immunocompromised due to concern for encephalitis and inability to obtain brain MRI given deep brain stimulators; however, given that her symptoms have improved with antipsychotic with [employer] medications believe this was an acute manic episode with psychotic features, will begin Depakote 3 times daily and olanzapine at night per psychiatry recommendations.  Discussed with neurology and since her symptoms have resolved with antipsychotic medications there is no clear need for an LP to be performed.  Evaluated by EPAT and psychiatry with plan for inpatient psychiatric admission tonight as she is medically stable.       Case discussed with case management, residents and patient.    DC Time: > 30 minutes    I saw and evaluated the patient. I personally obtained the key and critical portions of the history and physical exam or was physically present for key and critical portions performed by the resident/fellow. I reviewed the resident/fellow's documentation and discussed the patient with the resident/fellow. I agree with the resident/fellow's medical decision making as documented in the note.    Domenic English, DO

## 2024-10-10 NOTE — ASSESSMENT & PLAN NOTE
Acute encephalopathy- resolved  Suspect 2/2 acute psychosis (pt evaluated by psych- see recs)    Recommend:    May cancel LP as mental status has significantly improved.  No further neurological testing needed at this time.  Follow up with own neurologist at [facility] as scheduled after discharge.    Case/plan discussed and pt seen with Dr. Corrigan.  No further needs from neurology; okay for transfer or discharge as per primary team. Please contact if condition changes for re-eval.

## 2024-10-10 NOTE — PROGRESS NOTES
"Enid Sheffield is a 64 y.o. female with past medical history of cerebral palsy w/ presence of deep brain stimulator (2020) or Genetic torsion dystonia and anxiety/depression with hx of SI on day 1 of admission, who presented with 2 weeks of manic type symptoms including obsessively organizing things around the house and cleaning, not sleeping, and hallucinations.    Subjective   Last night, the patient walked around the hallway naked, argumentative with the staffs, and refused care.  She was subsequently given Haldol 2 mg IV and placed on soft restraints.  Patient slept through the night and states that she feels much better though still a little confused.   at bedside reports that patient is at her baseline now.  She is alert and oriented x4 with spontaneous fluent speech, linear thought process, and congruent affect.  She does not have any complaints at this time.  She denies visual or auditory hallucinations.    Objective     Last Recorded Vitals:  Blood pressure (!) 164/95, pulse 61, temperature 36.1 °C (97 °F), temperature source Temporal, resp. rate 18, height 1.676 m (5' 6\"), weight 75.2 kg (165 lb 12.6 oz), SpO2 94%.    Intake/Output last 3 Shifts:  No intake/output data recorded.    Physical Exam  Vitals and nursing note reviewed.   Constitutional:       General: She is not in acute distress.     Appearance: Normal appearance.   HENT:      Head: Normocephalic and atraumatic.   Eyes:      General: No scleral icterus.     Extraocular Movements: Extraocular movements intact.      Conjunctiva/sclera: Conjunctivae normal.   Cardiovascular:      Rate and Rhythm: Normal rate and regular rhythm.      Pulses: Normal pulses.      Heart sounds: Normal heart sounds. No murmur heard.  Pulmonary:      Effort: Pulmonary effort is normal. No respiratory distress.      Breath sounds: Normal breath sounds. No wheezing, rhonchi or rales.   Abdominal:      General: There is no distension.      Palpations: Abdomen is " soft.      Tenderness: There is no abdominal tenderness.   Musculoskeletal:         General: No tenderness.      Cervical back: Neck supple. No spinous process tenderness or muscular tenderness.      Right lower leg: No edema.      Left lower leg: No edema.   Skin:     General: Skin is warm and dry.   Neurological:      Mental Status: She is alert and oriented to person, place, and time.   Psychiatric:         Speech: Speech is delayed (due to history of cerebral palsy and dystonia).         Behavior: Behavior is cooperative.         Thought Content: Thought content is not paranoid or delusional.         Cognition and Memory: Cognition normal.         Judgment: Judgment normal.      Comments: Alert, oriented x 4.  Spontaneous fluent speech but slightly delayed due to history of cerebral palsy and dystonia.  Affect is congruent with mood.  Denies delusions, auditory or visual hallucinations.  Linear thought process.       Medications:        clonazePAM, 0.5 mg, oral, Daily  enoxaparin, 40 mg, subcutaneous, q24h  OLANZapine zydis, 5 mg, oral, Nightly  polyethylene glycol, 17 g, oral, Daily  potassium chloride CR, 40 mEq, oral, BID          Pertinent Labs:  Results for orders placed or performed during the hospital encounter of 10/09/24 (from the past 24 hour(s))   CBC and Auto Differential   Result Value Ref Range    WBC 5.6 4.4 - 11.3 x10*3/uL    nRBC 0.0 0.0 - 0.0 /100 WBCs    RBC 4.57 4.00 - 5.20 x10*6/uL    Hemoglobin 13.5 12.0 - 16.0 g/dL    Hematocrit 40.2 36.0 - 46.0 %    MCV 88 80 - 100 fL    MCH 29.5 26.0 - 34.0 pg    MCHC 33.6 32.0 - 36.0 g/dL    RDW 12.0 11.5 - 14.5 %    Platelets 160 150 - 450 x10*3/uL    Neutrophils % 68.6 40.0 - 80.0 %    Immature Granulocytes %, Automated 0.2 0.0 - 0.9 %    Lymphocytes % 20.6 13.0 - 44.0 %    Monocytes % 8.1 2.0 - 10.0 %    Eosinophils % 1.8 0.0 - 6.0 %    Basophils % 0.7 0.0 - 2.0 %    Neutrophils Absolute 3.83 1.20 - 7.70 x10*3/uL    Immature Granulocytes Absolute,  Automated 0.01 0.00 - 0.70 x10*3/uL    Lymphocytes Absolute 1.15 (L) 1.20 - 4.80 x10*3/uL    Monocytes Absolute 0.45 0.10 - 1.00 x10*3/uL    Eosinophils Absolute 0.10 0.00 - 0.70 x10*3/uL    Basophils Absolute 0.04 0.00 - 0.10 x10*3/uL   Renal Function Panel   Result Value Ref Range    Glucose 146 (H) 74 - 99 mg/dL    Sodium 141 136 - 145 mmol/L    Potassium 3.4 (L) 3.5 - 5.3 mmol/L    Chloride 106 98 - 107 mmol/L    Bicarbonate 28 21 - 32 mmol/L    Anion Gap 10 10 - 20 mmol/L    Urea Nitrogen 9 6 - 23 mg/dL    Creatinine 0.67 0.50 - 1.05 mg/dL    eGFR >90 >60 mL/min/1.73m*2    Calcium 8.8 8.6 - 10.3 mg/dL    Phosphorus 3.2 2.5 - 4.9 mg/dL    Albumin 4.0 3.4 - 5.0 g/dL   Magnesium   Result Value Ref Range    Magnesium 2.09 1.60 - 2.40 mg/dL       Imaging:   XR chest 1 view    Result Date: 10/9/2024  STUDY: Chest Radiograph;  10/9/2024 INDICATION: Altered mental status. COMPARISON: None Available ACCESSION NUMBER(S): [id number] ORDERING CLINICIAN: ALPA KELLY TECHNIQUE:  Frontal chest was obtained at 02:51 hours. FINDINGS: CARDIOMEDIASTINAL SILHOUETTE: Cardiomediastinal silhouette is mildly enlarged in size and configuration.  LUNGS: Lungs are clear.  ABDOMEN: No remarkable upper abdominal findings.  BONES: No acute osseous changes.    No acute pulmonary abnormality. Signed by Lan Velasquez MD    CT head wo IV contrast    Result Date: 10/9/2024  Interpreted By:  Adi Tobin, STUDY: CT HEAD WO IV CONTRAST;  10/9/2024 2:59 am   INDICATION: Signs/Symptoms:AMS.     COMPARISON: 07/04/2016 CT head without contrast   ACCESSION NUMBER(S): [id number]   ORDERING CLINICIAN: ALPA KELLY   TECHNIQUE: Noncontrast axial CT images of head were obtained with coronal and sagittal reconstructed images.   FINDINGS: BRAIN PARENCHYMA: There are 3 D brain stimulator probes, 2 on the right and 1 on the left, all of which terminating near the region of the bilateral subthalamic nuclei. No acute intraparenchymal hemorrhage or  "parenchymal evidence of acute large territory ischemic infarct. Gray-white matter distinction is preserved. No mass-effect.   VENTRICLES and EXTRA-AXIAL SPACES:  No acute extra-axial or intraventricular hemorrhage. No effacement of cerebral sulci. The ventricles and sulci are age-concordant.   PARANASAL SINUSES/MASTOIDS:  No hemorrhage or air-fluid levels within the visualized paranasal sinuses. The mastoids are well aerated.   CALVARIUM/ORBITS: There are 2 right frontal sean holes and a left frontal sean hole for deep brain stimulating probe access. No skull fracture.  The orbits and globes are intact to the extent visualized.   EXTRACRANIAL SOFT TISSUES: No discernible abnormality.       No acute intracranial abnormality.   Bilateral deep brain stimulating probes, 2 on the right and 1 on the left, all of which terminate near the subthalamic nuclei.   MACRO: None.   Signed by: Adi Tobin 10/9/2024 3:06 AM Dictation workstation:   [id number]     Assessment/Plan   This is a 64 y.o. female with past medical history of cerebral palsy w/ presence of deep brain stimulator (2020) or Genetic torsion dystonia and anxiety/depression with hx of SI on day 1 of admission, who presented with 2 weeks of manic type symptoms including obsessively organizing things around the house and cleaning, not sleeping, and hallucinations.  Patient's acute encephalopathy may be encephalitis/meningitis/seizure/urinary tract infection versus psychiatric disorder such as bipolar augustina.    # Acute encephalopathy   -Likely bipolar augustina. Low suspicion for encephalitis/meningitis/seizure.  -No prior history of bipolar disorder in the past.  She, however, was diagnosed with anxiety/depression and had a suicidal attempt (taking a a lot of pills to \"feel different\" per ) and depression in the past she required her to be admitted in the psychiatric unit.  -Symptoms may have been triggered by stress as they recently moved to a new home and " she was sick a month ago.  -Unremarkable CBC and CMP except mild hypokalemia 3.4.  Replenish with potassium chloride 40 mEq p.o.  -Ammonia is within normal limits.  -Urinalysis on 10/9/2024 was negative for leukocyte esterase, nitrites, and WBC.  -Noncontrast CT head on 10/9/2024 revealed no acute intracranial abnormality and stable bilateral deep brain stimulating probes which terminates near the subthalamic nuclei.  Cannot do MRI brain given presence of deep brain stimulator.  -Patient had an outburst of impulsiveness and agitation last evening, which has resolved following Haldol 2 mg IV and sleep.  -Mental status significantly improved today.  She is alert, oriented x 4 with spontaneous fluent slightly delayed speech given her history of cerebral palsy and dystonia, linear thought process, and congruent affect.  Per , she is at her baseline.  -Since her mental status significantly improved after Haldol and she has no fever plus neck pain/rigidity, neurology believes that her condition likely a psychiatric issue rather than neurologic.  There is no indication for EEG or LP at this time.  -Psychiatry on consult.  He placed patient on Klonopin 0.5 mg p.o. daily and Zyprexa 5 mg p.o. nightly.  He also added Depakote 250 every 8 hours.    -Since patient has significantly improved following psychiatric medications and she has no evidence of acute infection, she is medically cleared. She is okay to for inpatient psych admission.  -On Ativan 1 mg IV as needed for agitation.  -Off soft restraints.    Fluid: As needed  Electrolytes: Replete with goal K>4 and Mg>2  DVT prophylaxis: Lovenox  Diet: Regular  Bowel regimen: MiraLAX  CODE STATUS: DNR and DNI no ICU  Disposition: Pending clinical improvement and possible admission to inpatient psych.    Emergency Contact: Extended Emergency Contact Information  Primary Emergency Contact: YohannesBradly   [facility] [place]  Home Phone: [phone]  Relation:  Spouse  Secondary Emergency Contact: [place] [address] [place]  Mobile Phone: [phone]  Relation: Sister    Patient was seen and case discussed with the attending.  Karma Vásquez,   Internal Medicine PGY-1  Date: October 10, 2024

## 2024-10-10 NOTE — PROGRESS NOTES
10/10/24 0833   Discharge Planning   Expected Discharge Disposition Psych     Per psych note, recommendation is psych unit when patient is medically cleared for d/c.

## 2024-10-10 NOTE — NURSING NOTE
Dr. Welch on unit to access patient. Patient in bed at this time with restraints to bilateral wrist and and ankles. Patient reciting numbers and repeating the abcs. Patient says that it helps to calm down. Patient impulsive and states she will scream as loud as she wants and does not care about the other patients sleeping

## 2024-10-10 NOTE — NURSING NOTE
"Patient aggressive with staff attempting to leave without pants, underware, socks, shoes, bra, or shift. Patient widely open and patient will not allow staff to adjust clothing for modesty. Patient threatening to poop on the floor and make \"staff clean the mess\". Patient attempting to shove staff members out of her way and yelling near the open doorways of other patients. Patient unsteady walking and continues to repeat the word \"infinity\". Patient gesturing with hands erratically. Patient states that she is mistrustful of staff and hospitals. Patient assisted to bathroom per request and sitting on toilet banging her head on the wall. Patient then refusing to come out of the bathroom. Patient very unsteady and staff concerned she could fall off toilet. Staff assisted patient back to bed  "

## 2024-10-10 NOTE — PROGRESS NOTES
"Enid Sheffield is a 64 y.o. female on day 1 of admission presenting with Altered mental status, unspecified altered mental status type.    SUBJECTIVE:  Patient after receiving Haldol this morning.  Much more calm and cooperative but she continued to remain impulsive with poor insight and judgment into her presentation.  She was very agitated last night trying to leave the floor aggressive towards staff needing restraints   was at the bedside during my assessment  Patient continued to remain paranoid and disorganized with her thought process  Mood has been labile and unpredictable  Exam:  Vital Signs: BP (!) 164/95 (BP Location: Right arm, Patient Position: Lying)   Pulse 61   Temp 36.1 °C (97 °F) (Temporal)   Resp 18   Ht 1.676 m (5' 6\")   Wt 75.2 kg (165 lb 12.6 oz)   SpO2 94%   BMI 26.76 kg/m²   Musculoskeletal: Muscle tone: WNL  Gait/Station: normal      Mental Status Exam:    General: alert and oriented x 2  Appearance: stated age  Attitude: not cooperative  Behavior: agitated  Motor Activity: increased  Speech: rapid and pressured  Mood: anxious, tensed  Affect: labile  Thought Process: disorganized  Thought Content: delusional  Thought Perception: denies  Cognition:   Insight: poor  Judgement: poor     Psychiatric Risk Assessment  High risk of impulsivity and agitation     Results for orders placed or performed during the hospital encounter of 10/09/24 (from the past 96 hour(s))   CBC and Auto Differential   Result Value Ref Range    WBC 8.2 4.4 - 11.3 x10*3/uL    nRBC 0.0 0.0 - 0.0 /100 WBCs    RBC 4.74 4.00 - 5.20 x10*6/uL    Hemoglobin 13.8 12.0 - 16.0 g/dL    Hematocrit 40.7 36.0 - 46.0 %    MCV 86 80 - 100 fL    MCH 29.1 26.0 - 34.0 pg    MCHC 33.9 32.0 - 36.0 g/dL    RDW 11.8 11.5 - 14.5 %    Platelets 192 150 - 450 x10*3/uL    Neutrophils % 64.7 40.0 - 80.0 %    Immature Granulocytes %, Automated 0.1 0.0 - 0.9 %    Lymphocytes % 24.0 13.0 - 44.0 %    Monocytes % 7.4 2.0 - 10.0 %    " Eosinophils % 3.3 0.0 - 6.0 %    Basophils % 0.5 0.0 - 2.0 %    Neutrophils Absolute 5.28 1.20 - 7.70 x10*3/uL    Immature Granulocytes Absolute, Automated 0.01 0.00 - 0.70 x10*3/uL    Lymphocytes Absolute 1.96 1.20 - 4.80 x10*3/uL    Monocytes Absolute 0.60 0.10 - 1.00 x10*3/uL    Eosinophils Absolute 0.27 0.00 - 0.70 x10*3/uL    Basophils Absolute 0.04 0.00 - 0.10 x10*3/uL   Magnesium   Result Value Ref Range    Magnesium 1.98 1.60 - 2.40 mg/dL   Comprehensive metabolic panel   Result Value Ref Range    Glucose 140 (H) 74 - 99 mg/dL    Sodium 142 136 - 145 mmol/L    Potassium 3.3 (L) 3.5 - 5.3 mmol/L    Chloride 107 98 - 107 mmol/L    Bicarbonate 26 21 - 32 mmol/L    Anion Gap 12 10 - 20 mmol/L    Urea Nitrogen 12 6 - 23 mg/dL    Creatinine 0.83 0.50 - 1.05 mg/dL    eGFR 79 >60 mL/min/1.73m*2    Calcium 8.8 8.6 - 10.3 mg/dL    Albumin 4.2 3.4 - 5.0 g/dL    Alkaline Phosphatase 71 33 - 136 U/L    Total Protein 6.1 (L) 6.4 - 8.2 g/dL    AST 21 9 - 39 U/L    Bilirubin, Total 0.8 0.0 - 1.2 mg/dL    ALT 16 7 - 45 U/L   Lipase   Result Value Ref Range    Lipase 38 9 - 82 U/L   Lactate   Result Value Ref Range    Lactate 2.0 0.4 - 2.0 mmol/L   Troponin I, High Sensitivity, Initial   Result Value Ref Range    Troponin I, High Sensitivity 8 0 - 13 ng/L   Folate   Result Value Ref Range    Folate, Serum 10.4 >5.0 ng/mL   TSH   Result Value Ref Range    Thyroid Stimulating Hormone 5.24 (H) 0.44 - 3.98 mIU/L   C-reactive protein   Result Value Ref Range    C-Reactive Protein 0.10 <1.00 mg/dL   Sars-CoV-2 PCR   Result Value Ref Range    Coronavirus 2019, PCR Not Detected Not Detected   Influenza A, and B PCR   Result Value Ref Range    Flu A Result Not Detected Not Detected    Flu B Result Not Detected Not Detected   Lavender Top   Result Value Ref Range    Extra Tube Hold for add-ons.    Urinalysis with Reflex Culture and Microscopic   Result Value Ref Range    Color, Urine Yellow Light-Yellow, Yellow, Dark-Yellow     Appearance, Urine Clear Clear    Specific Gravity, Urine 1.021 1.005 - 1.035    pH, Urine 6.0 5.0, 5.5, 6.0, 6.5, 7.0, 7.5, 8.0    Protein, Urine 20 (TRACE) NEGATIVE, 10 (TRACE), 20 (TRACE) mg/dL    Glucose, Urine Normal Normal mg/dL    Blood, Urine 0.2 (2+) (A) NEGATIVE    Ketones, Urine NEGATIVE NEGATIVE mg/dL    Bilirubin, Urine NEGATIVE NEGATIVE    Urobilinogen, Urine 2 (1+) (A) Normal mg/dL    Nitrite, Urine NEGATIVE NEGATIVE    Leukocyte Esterase, Urine NEGATIVE NEGATIVE   Extra Urine Gray Tube   Result Value Ref Range    Extra Tube Hold for add-ons.    Urinalysis Microscopic   Result Value Ref Range    WBC, Urine 1-5 1-5, NONE /HPF    RBC, Urine 6-10 (A) NONE, 1-2, 3-5 /HPF    Budding Yeast, Urine PRESENT (A) NONE /HPF    Mucus, Urine 3+ Reference range not established. /LPF    Hyaline Casts, Urine 2+ (A) NONE /LPF   Troponin, High Sensitivity, 1 Hour   Result Value Ref Range    Troponin I, High Sensitivity 8 0 - 13 ng/L   Vitamin B12   Result Value Ref Range    Vitamin B12 291 211 - 911 pg/mL   Vitamin D 25-Hydroxy,Total (for eval of Vitamin D levels)   Result Value Ref Range    Vitamin D, 25-Hydroxy, Total 44 30 - 100 ng/mL   Green Top   Result Value Ref Range    Extra Tube Hold for add-ons.    Lavender Top   Result Value Ref Range    Extra Tube Hold for add-ons.    SST TOP   Result Value Ref Range    Extra Tube Hold for add-ons.    SST TOP   Result Value Ref Range    Extra Tube Hold for add-ons.    PST Top   Result Value Ref Range    Extra Tube Hold for add-ons.    Sedimentation Rate   Result Value Ref Range    Sedimentation Rate 8 0 - 30 mm/h   Ammonia   Result Value Ref Range    Ammonia 28 16 - 53 umol/L   CBC and Auto Differential   Result Value Ref Range    WBC 5.6 4.4 - 11.3 x10*3/uL    nRBC 0.0 0.0 - 0.0 /100 WBCs    RBC 4.57 4.00 - 5.20 x10*6/uL    Hemoglobin 13.5 12.0 - 16.0 g/dL    Hematocrit 40.2 36.0 - 46.0 %    MCV 88 80 - 100 fL    MCH 29.5 26.0 - 34.0 pg    MCHC 33.6 32.0 - 36.0 g/dL    RDW  12.0 11.5 - 14.5 %    Platelets 160 150 - 450 x10*3/uL    Neutrophils % 68.6 40.0 - 80.0 %    Immature Granulocytes %, Automated 0.2 0.0 - 0.9 %    Lymphocytes % 20.6 13.0 - 44.0 %    Monocytes % 8.1 2.0 - 10.0 %    Eosinophils % 1.8 0.0 - 6.0 %    Basophils % 0.7 0.0 - 2.0 %    Neutrophils Absolute 3.83 1.20 - 7.70 x10*3/uL    Immature Granulocytes Absolute, Automated 0.01 0.00 - 0.70 x10*3/uL    Lymphocytes Absolute 1.15 (L) 1.20 - 4.80 x10*3/uL    Monocytes Absolute 0.45 0.10 - 1.00 x10*3/uL    Eosinophils Absolute 0.10 0.00 - 0.70 x10*3/uL    Basophils Absolute 0.04 0.00 - 0.10 x10*3/uL   Renal Function Panel   Result Value Ref Range    Glucose 146 (H) 74 - 99 mg/dL    Sodium 141 136 - 145 mmol/L    Potassium 3.4 (L) 3.5 - 5.3 mmol/L    Chloride 106 98 - 107 mmol/L    Bicarbonate 28 21 - 32 mmol/L    Anion Gap 10 10 - 20 mmol/L    Urea Nitrogen 9 6 - 23 mg/dL    Creatinine 0.67 0.50 - 1.05 mg/dL    eGFR >90 >60 mL/min/1.73m*2    Calcium 8.8 8.6 - 10.3 mg/dL    Phosphorus 3.2 2.5 - 4.9 mg/dL    Albumin 4.0 3.4 - 5.0 g/dL   Magnesium   Result Value Ref Range    Magnesium 2.09 1.60 - 2.40 mg/dL         Impression:  Bipolar manic    Recommendations:    1.  Add Depakote 50 mg 3 times a day  2 continue other medications  3, patient will need admission to psychiatry unit when medically stable      Thank you for allowing us to participate in the care of this patient.       Ronak Fontana MD  10/10/2024  2:50 PM

## 2024-10-10 NOTE — SIGNIFICANT EVENT
Significant event      I was paged by the bedside nurse regarding that the patient is refusing care, displaying paranoid behavior, and walking in the hallway. Upon my arrival, the patient was found in the hallway, refusing care and being impulsive and argumentative with staff. She declined to have her vital signs taken or undergo a medical examination.  She stated that she wants to leave the room and requested that her  be contacted.    We attempted to reorient the patient and reached out to her relatives. Her  was successfully contacted; however, the patient remained argumentative, stating she did not trust the staff and insisted on leaving the hospital immediately.    Staff members assisted her back to bed, but she immediately attempted to get up again, becoming confrontational with the bedside nurse. She then placed herself on the ground on her hands and knees, trying to crawl away. The staff, along with security, placed her back into bed once more, at which point an order was given to apply four-point soft restraints for her safety.    During my assessment, the patient was alert and oriented to person, place, and time (ANO x3) but lacked the capacity to make informed decisions. The restraints were applied, but there was no prior EKG available to evaluate the QTc interval, and the patient refused to undergo an EKG. She was administered 2 mg of IM Haldol for her agitation.    I will reassess the patient after the medication takes effect.       Andres Welch MD   Internal Medicine, PGY-2 .

## 2024-10-10 NOTE — SIGNIFICANT EVENT
Patient is medically clear for transfer to inpatient psych unit.    Dioni Faria D.O.  PGY-2 Internal medicine resident

## 2024-10-11 VITALS
RESPIRATION RATE: 16 BRPM | TEMPERATURE: 97.2 F | OXYGEN SATURATION: 98 % | HEART RATE: 58 BPM | HEIGHT: 66 IN | WEIGHT: 165.79 LBS | SYSTOLIC BLOOD PRESSURE: 142 MMHG | BODY MASS INDEX: 26.64 KG/M2 | DIASTOLIC BLOOD PRESSURE: 79 MMHG

## 2024-10-11 LAB
ATRIAL RATE: 61 BPM
P OFFSET: 138 MS
P ONSET: 44 MS
PR INTERVAL: 230 MS
Q ONSET: 159 MS
QRS COUNT: 10 BEATS
QRS DURATION: 180 MS
QT INTERVAL: 552 MS
QTC CALCULATION(BAZETT): 555 MS
QTC FREDERICIA: 554 MS
R AXIS: 136 DEGREES
T AXIS: 167 DEGREES
T OFFSET: 435 MS
VENTRICULAR RATE: 61 BPM

## 2024-10-11 NOTE — NURSING NOTE
Patient Dc'd to [facility]. Report called, IV out, tele off. Patient belongings sent with patient

## 2024-10-12 LAB
AMPHETAMINES SERPL QL SCN: NEGATIVE NG/ML
ANNOTATION COMMENT IMP: NORMAL
BARBITURATES SERPL QL SCN: NEGATIVE NG/ML
BENZODIAZ SERPL QL SCN: NEGATIVE NG/ML
BUPRENORPHINE SERPL-MCNC: NEGATIVE NG/ML
CANNABINOIDS SERPL QL SCN: NEGATIVE NG/ML
COCAINE SERPL QL SCN: NEGATIVE NG/ML
METHADONE SERPL QL SCN: NEGATIVE NG/ML
METHAMPHET SERPL QL: NEGATIVE NG/ML
OPIATES SERPL QL SCN: NEGATIVE NG/ML
OXYCODONE SERPL QL: NEGATIVE NG/ML
PCP SERPL QL SCN: NEGATIVE NG/ML
VIT B1 PYROPHOSHATE BLD-SCNC: 147 NMOL/L (ref 70–180)

## 2024-10-15 ENCOUNTER — APPOINTMENT (OUTPATIENT)
Dept: PRIMARY CARE | Facility: CLINIC | Age: 64
End: 2024-10-15
Payer: COMMERCIAL

## 2024-10-16 LAB
ATRIAL RATE: 61 BPM
NIACIN SERPL-MCNC: NORMAL NG/ML
NICOTINAMIDE SERPL-MCNC: 10 NG/ML
NICOTINURATE SERPL-MCNC: NORMAL NG/ML
P OFFSET: 138 MS
P ONSET: 44 MS
PR INTERVAL: 230 MS
Q ONSET: 159 MS
QRS COUNT: 10 BEATS
QRS DURATION: 180 MS
QT INTERVAL: 552 MS
QTC CALCULATION(BAZETT): 555 MS
QTC FREDERICIA: 554 MS
R AXIS: 136 DEGREES
SCAN RESULT: NORMAL
T AXIS: 167 DEGREES
T OFFSET: 435 MS
VENTRICULAR RATE: 61 BPM

## 2024-10-28 ENCOUNTER — APPOINTMENT (OUTPATIENT)
Dept: PODIATRY | Facility: CLINIC | Age: 64
End: 2024-10-28
Payer: COMMERCIAL

## 2024-10-29 ENCOUNTER — APPOINTMENT (OUTPATIENT)
Dept: PRIMARY CARE | Facility: CLINIC | Age: 64
End: 2024-10-29
Payer: COMMERCIAL

## 2024-10-29 VITALS
BODY MASS INDEX: 26.63 KG/M2 | SYSTOLIC BLOOD PRESSURE: 126 MMHG | TEMPERATURE: 96.6 F | WEIGHT: 165 LBS | DIASTOLIC BLOOD PRESSURE: 82 MMHG

## 2024-10-29 DIAGNOSIS — E78.5 HYPERLIPIDEMIA, UNSPECIFIED HYPERLIPIDEMIA TYPE: Primary | ICD-10-CM

## 2024-10-29 PROCEDURE — 3074F SYST BP LT 130 MM HG: CPT | Performed by: FAMILY MEDICINE

## 2024-10-29 PROCEDURE — 3079F DIAST BP 80-89 MM HG: CPT | Performed by: FAMILY MEDICINE

## 2024-10-29 PROCEDURE — 99213 OFFICE O/P EST LOW 20 MIN: CPT | Performed by: FAMILY MEDICINE

## 2024-10-29 RX ORDER — ROSUVASTATIN CALCIUM 5 MG/1
5 TABLET, COATED ORAL EVERY OTHER DAY
Qty: 90 TABLET | Refills: 3 | Status: SHIPPED | OUTPATIENT
Start: 2024-10-29

## 2024-10-29 RX ORDER — SERTRALINE HYDROCHLORIDE 100 MG/1
100 TABLET, FILM COATED ORAL 2 TIMES DAILY
Qty: 180 TABLET | Refills: 0 | Status: SHIPPED | OUTPATIENT
Start: 2024-10-29

## 2024-10-29 RX ORDER — TRAZODONE HYDROCHLORIDE 50 MG/1
100 TABLET ORAL NIGHTLY
Qty: 180 TABLET | Refills: 0 | Status: SHIPPED | OUTPATIENT
Start: 2024-10-29

## 2024-11-05 ENCOUNTER — OFFICE VISIT (OUTPATIENT)
Dept: URGENT CARE | Age: 64
End: 2024-11-05
Payer: COMMERCIAL

## 2024-11-05 VITALS
OXYGEN SATURATION: 96 % | TEMPERATURE: 97.9 F | BODY MASS INDEX: 28.12 KG/M2 | SYSTOLIC BLOOD PRESSURE: 139 MMHG | WEIGHT: 175 LBS | HEART RATE: 66 BPM | DIASTOLIC BLOOD PRESSURE: 81 MMHG | RESPIRATION RATE: 20 BRPM | HEIGHT: 66 IN

## 2024-11-05 DIAGNOSIS — L03.012 CELLULITIS OF LEFT MIDDLE FINGER: Primary | ICD-10-CM

## 2024-11-05 PROCEDURE — 99213 OFFICE O/P EST LOW 20 MIN: CPT | Performed by: STUDENT IN AN ORGANIZED HEALTH CARE EDUCATION/TRAINING PROGRAM

## 2024-11-05 PROCEDURE — 90471 IMMUNIZATION ADMIN: CPT | Performed by: STUDENT IN AN ORGANIZED HEALTH CARE EDUCATION/TRAINING PROGRAM

## 2024-11-05 PROCEDURE — 3008F BODY MASS INDEX DOCD: CPT | Performed by: STUDENT IN AN ORGANIZED HEALTH CARE EDUCATION/TRAINING PROGRAM

## 2024-11-05 PROCEDURE — 3079F DIAST BP 80-89 MM HG: CPT | Performed by: STUDENT IN AN ORGANIZED HEALTH CARE EDUCATION/TRAINING PROGRAM

## 2024-11-05 PROCEDURE — 90715 TDAP VACCINE 7 YRS/> IM: CPT | Performed by: STUDENT IN AN ORGANIZED HEALTH CARE EDUCATION/TRAINING PROGRAM

## 2024-11-05 PROCEDURE — 1036F TOBACCO NON-USER: CPT | Performed by: STUDENT IN AN ORGANIZED HEALTH CARE EDUCATION/TRAINING PROGRAM

## 2024-11-05 PROCEDURE — 3075F SYST BP GE 130 - 139MM HG: CPT | Performed by: STUDENT IN AN ORGANIZED HEALTH CARE EDUCATION/TRAINING PROGRAM

## 2024-11-05 RX ORDER — DOXYCYCLINE 100 MG/1
100 CAPSULE ORAL 2 TIMES DAILY
Qty: 10 CAPSULE | Refills: 0 | Status: SHIPPED | OUTPATIENT
Start: 2024-11-05 | End: 2024-11-10

## 2024-11-05 RX ORDER — MUPIROCIN CALCIUM 20 MG/G
CREAM TOPICAL 2 TIMES DAILY
Qty: 30 G | Refills: 0 | Status: SHIPPED | OUTPATIENT
Start: 2024-11-05 | End: 2024-11-12

## 2024-11-05 NOTE — PROGRESS NOTES
"Subjective   Patient ID: Enid Sheffield is a 64 y.o. female. They present today with a chief complaint of infected cut  (Infected cut on left middle finger x6day).    History of Present Illness  Enid is a right-hand-dominant 64-year-old female who presents to the urgent care for evaluation of left middle finger wound that developed about a week ago after opening a \"can of cat food\".  Patient states she had a cut from the can and has been using supportive measures without clearing of wound and has concerns for possible infection.  Patient denies any fever or chills or other constitutional or systemic symptoms.  No other symptoms or injuries otherwise reported.  Patient states her tetanus status is nearly expiring next year with 1 year remaining since last booster.      Past Medical History  Allergies as of 11/05/2024 - Reviewed 11/05/2024   Allergen Reaction Noted    Penicillins Rash 06/14/2000    Sulfa (sulfonamide antibiotics) Rash 11/20/2023       (Not in a hospital admission)       Past Medical History:   Diagnosis Date    Abrasion of ear region 09/26/2024    Acute gastroenteritis 09/26/2024    Acute lower UTI 09/26/2024    Allergic     Anxiety     Arm pain 09/26/2024    Bilateral impacted cerumen 09/26/2024    C. difficile diarrhea 09/26/2024    Closed Colles' fracture 04/03/2019    Closed fracture of upper end of lower leg 09/10/2012    Dermatitis of ear canal 09/26/2024    Diarrhea 09/26/2024    Difficulty walking     Ear canal abrasion 09/26/2024    Encounter for immunization 03/31/2017    Need for influenza vaccination    Encounter for screening for malignant neoplasm of cervix 06/11/2016    Screening for malignant neoplasm of cervix    Flank pain 05/05/2018    Generalized hyperhidrosis 08/10/2015    Diaphoresis    Headache 09/26/2024    Hematuria 09/26/2024    High arches     Impacted cerumen, bilateral 03/02/2022    Bilateral impacted cerumen    Laceration without foreign body of unspecified eyelid and " "periocular area, initial encounter 06/11/2016    Eyebrow laceration    Pain in limb 09/10/2012    Pain of upper extremity 09/26/2024    Personal history of other diseases of the circulatory system     History of hypertension    Personal history of other diseases of the digestive system     History of hepatic disease    Personal history of other diseases of the respiratory system 10/03/2014    History of acute bronchitis    Personal history of other diseases of urinary system     History of kidney disease    Plantar fasciitis     Unspecified fracture of upper end of unspecified tibia, initial encounter for closed fracture 01/27/2012    [facility]        Past Surgical History:   Procedure Laterality Date    ANKLE FRACTURE SURGERY      CERVICAL FUSION  09/06/2013    Cervical Vertebral Fusion    FRACTURE SURGERY  2004    LUMBAR DISCECTOMY  09/06/2013    Spinal Diskectomy    OTHER SURGICAL HISTORY  01/20/2021    Colonoscopy    OTHER SURGICAL HISTORY  03/31/2014    Acellular Dermal Replacement Ankles    TOENAIL EXCISION      WISDOM TOOTH EXTRACTION          reports that she has never smoked. She has never used smokeless tobacco. She reports that she does not currently use alcohol after a past usage of about 2.0 standard drinks of alcohol per week. She reports that she does not use drugs.    Review of Systems  A 10-point review of systems was performed, otherwise unremarkable unless stated in the history of present illness.                Objective    Vitals:    11/05/24 1511   BP: 139/81   Pulse: 66   Resp: 20   Temp: 36.6 °C (97.9 °F)   TempSrc: Oral   SpO2: 96%   Weight: 79.4 kg (175 lb)   Height: 1.676 m (5' 6\")     No LMP recorded.    Gen: Vitals noted and reviewed, no evidence of acute distress, well developed and afebrile.   Psych: Mood and affect appropriate for setting.  Head/Face: Atraumatic and normocephalic.   Neuro: No focal deficits noted.  Cardiac: Regular rate and rhythm no murmur.   Lungs: Clear to " auscultation throughout, No evidence of wheezing, rhonchi or crackles. Good aeration throughout.   Extremities: Symmetrical, No peripheral edema  Skin: Erythematous patch medially with central scabbing over the IP portion of the left middle finger not involving the nailbed.  No active discharge, fluctuant mass, crepitus or vesicles or pustules noted. No evidence of ecchymosis or petechiae noted.      Point of Care Test & Imaging Results from this visit  No results found for this visit on 11/05/24.   No results found.    Diagnostic study results (if any) were reviewed by Hank Aguilar DO.    Assessment/Plan   Allergies, medications, history, and pertinent labs/EKGs/Imaging reviewed by Hank Aguilar DO.     Medical Decision Making  Discussed with the patient symptoms and clinical presentation findings suggestive of a cellulitis likely secondary to wound infection from injury of unclear etiology.  We advise close symptom monitoring supportive treatment.  Reviewed patient allergies to medications and agreed to prescribe doxycycline for 5 days duration.  Will also prescribe mupirocin topically.  We advised patient to consult with immunologist and primary care provider prior to starting these medications.  Given the patient's tetanus status near due next year we offered Tdap booster in office today and the patient was agreeable to this and tolerated well without complications. Follow up with PCP. We advised seeking immediate emergency medical attention if symptoms fail to improve, worsen or any concerning symptoms arise. Patient voiced full understanding and agreement to plan.      Orders and Diagnoses  Diagnoses and all orders for this visit:  Cellulitis of left middle finger  -     doxycycline (Monodox) 100 mg capsule; Take 1 capsule (100 mg) by mouth 2 times a day for 5 days. Take with at least 8 ounces (large glass) of water, do not lie down for 30 minutes after  -     mupirocin (Bactroban) 2 % cream; Apply topically  2 times a day for 7 days. Thin film to affected finger  Other orders  -     Tdap vaccine, age 7 years and older  (BOOSTRIX)      Medical Admin Record      Patient disposition: Home    Electronically signed by Hank Aguilar DO  7:09 PM

## 2024-11-19 ENCOUNTER — APPOINTMENT (OUTPATIENT)
Dept: PRIMARY CARE | Facility: CLINIC | Age: 64
End: 2024-11-19
Payer: COMMERCIAL

## 2024-12-11 ENCOUNTER — PATIENT MESSAGE (OUTPATIENT)
Dept: PRIMARY CARE | Facility: CLINIC | Age: 64
End: 2024-12-11
Payer: COMMERCIAL

## 2024-12-22 ENCOUNTER — OFFICE VISIT (OUTPATIENT)
Dept: URGENT CARE | Age: 64
End: 2024-12-22
Payer: COMMERCIAL

## 2024-12-22 VITALS
BODY MASS INDEX: 28.25 KG/M2 | TEMPERATURE: 97.8 F | OXYGEN SATURATION: 95 % | WEIGHT: 175 LBS | SYSTOLIC BLOOD PRESSURE: 170 MMHG | RESPIRATION RATE: 22 BRPM | DIASTOLIC BLOOD PRESSURE: 104 MMHG | HEART RATE: 68 BPM
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
## 2024-12-22 PROCEDURE — 3077F SYST BP >= 140 MM HG: CPT | Performed by: NURSE PRACTITIONER

## 2024-12-22 PROCEDURE — 12001 RPR S/N/AX/GEN/TRNK 2.5CM/<: CPT | Performed by: NURSE PRACTITIONER

## 2024-12-22 PROCEDURE — 3080F DIAST BP >= 90 MM HG: CPT | Performed by: NURSE PRACTITIONER

## 2024-12-22 PROCEDURE — 99213 OFFICE O/P EST LOW 20 MIN: CPT | Performed by: NURSE PRACTITIONER

## 2024-12-22 RX ORDER — DOXYCYCLINE 100 MG/1
100 CAPSULE ORAL 2 TIMES DAILY
Qty: 14 CAPSULE | Refills: 0 | Status: SHIPPED | OUTPATIENT
Start: 2024-12-22 | End: 2024-12-29

## 2024-12-22 RX ORDER — METRONIDAZOLE 500 MG/1
500 TABLET ORAL 3 TIMES DAILY
Qty: 21 TABLET | Refills: 0 | Status: SHIPPED | OUTPATIENT
Start: 2024-12-22 | End: 2024-12-29

## 2024-12-22 NOTE — PATIENT INSTRUCTIONS
Antibiotics were called into pharmacy. You are updated on your tetanus. Keep the wounds clean and dry. Just use soap and water to clean them. Do not use Neosporin or bacitracin ointment. You may cover them with a Band-Aid. Look for any signs of infection if this occurs come back and we will reevaluate otherwise follow-up with your primary care doctor on an outpatient basis.  Sutures to be removed in 10-14 days

## 2024-12-22 NOTE — PROGRESS NOTES
Subjective   Patient ID: Enid Sheffield is a 64 y.o. female. They present today with a chief complaint of Animal Bite (Left hand ).    History of Present Illness    Animal Bite    Patient is a 64-year-old female who presents with a dog bite to her left hand.  She states her neighbors dog bit her when she went to extend her hand to say hi.  Dog is up-to-date on his shots.  Patient is up-to-date on her tetanus.  Incident happened today about 2 hours prior to coming in.  Past Medical History  Allergies as of 12/22/2024 - Reviewed 12/22/2024   Allergen Reaction Noted    Penicillins Rash 06/14/2000    Sulfa (sulfonamide antibiotics) Rash 11/20/2023       (Not in a hospital admission)       Past Medical History:   Diagnosis Date    Abrasion of ear region 09/26/2024    Acute gastroenteritis 09/26/2024    Acute lower UTI 09/26/2024    Allergic     Anxiety     Arm pain 09/26/2024    Bilateral impacted cerumen 09/26/2024    C. difficile diarrhea 09/26/2024    Closed Colles' fracture 04/03/2019    Closed fracture of upper end of lower leg 09/10/2012    Dermatitis of ear canal 09/26/2024    Diarrhea 09/26/2024    Difficulty walking     Ear canal abrasion 09/26/2024    Encounter for immunization 03/31/2017    Need for influenza vaccination    Encounter for screening for malignant neoplasm of cervix 06/11/2016    Screening for malignant neoplasm of cervix    Flank pain 05/05/2018    Generalized hyperhidrosis 08/10/2015    Diaphoresis    Headache 09/26/2024    Hematuria 09/26/2024    High arches     Impacted cerumen, bilateral 03/02/2022    Bilateral impacted cerumen    Laceration without foreign body of unspecified eyelid and periocular area, initial encounter 06/11/2016    Eyebrow laceration    Pain in limb 09/10/2012    Pain of upper extremity 09/26/2024    Personal history of other diseases of the circulatory system     History of hypertension    Personal history of other diseases of the digestive system     History of hepatic  disease    Personal history of other diseases of the respiratory system 10/03/2014    History of acute bronchitis    Personal history of other diseases of urinary system     History of kidney disease    Plantar fasciitis     Unspecified fracture of upper end of unspecified tibia, initial encounter for closed fracture 01/27/2012    [facility]        Past Surgical History:   Procedure Laterality Date    ANKLE FRACTURE SURGERY      CERVICAL FUSION  09/06/2013    Cervical Vertebral Fusion    FRACTURE SURGERY  2004    LUMBAR DISCECTOMY  09/06/2013    Spinal Diskectomy    OTHER SURGICAL HISTORY  01/20/2021    Colonoscopy    OTHER SURGICAL HISTORY  03/31/2014    Acellular Dermal Replacement Ankles    TOENAIL EXCISION      WISDOM TOOTH EXTRACTION          reports that she has never smoked. She has never used smokeless tobacco. She reports that she does not currently use alcohol after a past usage of about 2.0 standard drinks of alcohol per week. She reports that she does not use drugs.    Review of Systems  Review of Systems  Gen: No fatigue, fever, sweats.  Head: No headache, trauma.  Eyes: No vision loss, double vision, drainage, eye pain.  ENT: No hearing changes, pain, epistaxis, congestion  Cardiac: No chest pain  Pulmonary: No shortness of breath,  pleuritic pain,   Heme/lymph: No swollen glands  GI: No abdominal pain, nausea, vomiting, diarrhea  : No  dysuria, frequency, urgency, hematuria  Musculoskeletal: No limb pain, joint pain, back pain, joint swelling or stiffness.  Skin: + hand laceration from dog bite  Neuro: No Numbness, tingling, or weakness.  Psych: No  anxiety     Review of systems is otherwise negative unless stated above or in history of present illness.                             Objective    Vitals:    12/22/24 1645   BP: (!) 170/104   Pulse: 68   Resp: 22   Temp: 36.6 °C (97.8 °F)   SpO2: 95%   Weight: 79.4 kg (175 lb)     No LMP recorded. Patient is postmenopausal.    Physical Exam  Hand  Injury:  General: Vitals noted, no distress. Afebrile.  EENT: TMs clear. Eyes unremarkable. Posterior oropharynx unremarkable.  Cardiac: Regular, rate, rhythm, no murmur.  Pulmonary: Lungs clear bilaterally with good aeration. No adventitious breath sounds.  Abdomen: Soft, nontender, nonsurgical. No peritoneal signs. Normoactive bowel sounds.  Extremities: No peripheral edema. Exam of the left hand shows 2 cm laceration to hand. No foreign body.  Is neurovascularly intact distally. Specifically, has full strength with flexion and extension of the digits. Is nontender over the wrist. Remainder the extremity is nontender.  Skin: No rash.  Neuro: No focal neurologic deficits, NIH score of 0.  Laceration Repair    Date/Time: 12/22/2024 5:28 PM    Performed by: LV Herron  Authorized by: LV Herron    Consent:     Consent obtained:  Verbal    Consent given by:  Patient    Risks, benefits, and alternatives were discussed: yes      Risks discussed:  Infection and pain  Universal protocol:     Patient identity confirmed:  Verbally with patient  Anesthesia:     Anesthesia method:  Local infiltration    Local anesthetic:  Lidocaine 1% w/o epi  Laceration details:     Location:  Hand    Hand location:  L hand, dorsum    Length (cm):  2  Pre-procedure details:     Preparation:  Patient was prepped and draped in usual sterile fashion  Treatment:     Area cleansed with:  Krishna    Amount of cleaning:  Standard    Irrigation solution:  Sterile saline  Skin repair:     Repair method:  Sutures    Suture size:  4-0    Suture material:  Nylon    Suture technique:  Simple interrupted    Number of sutures:  3  Approximation:     Approximation:  Loose  Repair type:     Repair type:  Simple  Post-procedure details:     Dressing:  Adhesive bandage    Procedure completion:  Tolerated      Point of Care Test & Imaging Results from this visit  No results found for this visit on 12/22/24.   No results  found.    Diagnostic study results (if any) were reviewed by LV Herron.    Assessment/Plan   Allergies, medications, history, and pertinent labs/EKGs/Imaging reviewed by LV Herron.     Medical Decision Making      Orders and Diagnoses  There are no diagnoses linked to this encounter.    Medical Admin Record      Patient disposition: Home    Electronically signed by LV Herron  5:09 PM

## 2025-01-02 ENCOUNTER — APPOINTMENT (OUTPATIENT)
Dept: URGENT CARE | Age: 65
End: 2025-01-02
Payer: COMMERCIAL

## 2025-01-03 ENCOUNTER — OFFICE VISIT (OUTPATIENT)
Dept: URGENT CARE | Age: 65
End: 2025-01-03
Payer: COMMERCIAL

## 2025-01-03 VITALS
SYSTOLIC BLOOD PRESSURE: 145 MMHG | RESPIRATION RATE: 16 BRPM | HEART RATE: 59 BPM | OXYGEN SATURATION: 96 % | WEIGHT: 175 LBS | TEMPERATURE: 97.3 F | DIASTOLIC BLOOD PRESSURE: 85 MMHG | BODY MASS INDEX: 28.25 KG/M2

## 2025-01-03 DIAGNOSIS — Z48.02 VISIT FOR SUTURE REMOVAL: Primary | ICD-10-CM

## 2025-01-03 ASSESSMENT — ENCOUNTER SYMPTOMS
NUMBNESS: 0
WEAKNESS: 0
SHORTNESS OF BREATH: 0
FEVER: 0
CHEST TIGHTNESS: 0
WOUND: 1
COLOR CHANGE: 0
COUGH: 0
WHEEZING: 0
CHILLS: 0

## 2025-01-03 ASSESSMENT — PAIN SCALES - GENERAL: PAINLEVEL_OUTOF10: 0-NO PAIN

## 2025-01-03 NOTE — PROGRESS NOTES
Subjective   Patient ID: Enid Sheffield is a 64 y.o. female. They present today with a chief complaint of Suture / Staple Removal (On left hand, placed 12/22. 3 stitches).    History of Present Illness    History provided by:  Patient   used: No    Suture / Staple Removal   The sutures were placed 11 to 14 days ago (12/22/24). Treatments since wound repair include oral antibiotics. There has been no drainage from the wound. There is no redness present. There is no swelling present. Pain course: 1/10. She has no difficulty moving the affected extremity or digit.       Past Medical History  Allergies as of 01/03/2025 - Reviewed 01/03/2025   Allergen Reaction Noted    Penicillins Rash 06/14/2000    Sulfa (sulfonamide antibiotics) Rash 11/20/2023       (Not in a hospital admission)       Past Medical History:   Diagnosis Date    Abrasion of ear region 09/26/2024    Acute gastroenteritis 09/26/2024    Acute lower UTI 09/26/2024    Allergic     Anxiety     Arm pain 09/26/2024    Bilateral impacted cerumen 09/26/2024    C. difficile diarrhea 09/26/2024    Closed Colles' fracture 04/03/2019    Closed fracture of upper end of lower leg 09/10/2012    Dermatitis of ear canal 09/26/2024    Diarrhea 09/26/2024    Difficulty walking     Ear canal abrasion 09/26/2024    Encounter for immunization 03/31/2017    Need for influenza vaccination    Encounter for screening for malignant neoplasm of cervix 06/11/2016    Screening for malignant neoplasm of cervix    Flank pain 05/05/2018    Generalized hyperhidrosis 08/10/2015    Diaphoresis    Headache 09/26/2024    Hematuria 09/26/2024    High arches     Impacted cerumen, bilateral 03/02/2022    Bilateral impacted cerumen    Laceration without foreign body of unspecified eyelid and periocular area, initial encounter 06/11/2016    Eyebrow laceration    Pain in limb 09/10/2012    Pain of upper extremity 09/26/2024    Personal history of other diseases of the  circulatory system     History of hypertension    Personal history of other diseases of the digestive system     History of hepatic disease    Personal history of other diseases of the respiratory system 10/03/2014    History of acute bronchitis    Personal history of other diseases of urinary system     History of kidney disease    Plantar fasciitis     Unspecified fracture of upper end of unspecified tibia, initial encounter for closed fracture 01/27/2012    [facility]        Past Surgical History:   Procedure Laterality Date    ANKLE FRACTURE SURGERY      CERVICAL FUSION  09/06/2013    Cervical Vertebral Fusion    FRACTURE SURGERY  2004    LUMBAR DISCECTOMY  09/06/2013    Spinal Diskectomy    OTHER SURGICAL HISTORY  01/20/2021    Colonoscopy    OTHER SURGICAL HISTORY  03/31/2014    Acellular Dermal Replacement Ankles    TOENAIL EXCISION      WISDOM TOOTH EXTRACTION          reports that she has never smoked. She has never used smokeless tobacco. She reports that she does not currently use alcohol after a past usage of about 2.0 standard drinks of alcohol per week. She reports that she does not use drugs.    Review of Systems  Review of Systems   Constitutional:  Negative for chills and fever.   Respiratory:  Negative for cough, chest tightness, shortness of breath and wheezing.    Cardiovascular:  Negative for chest pain.   Skin:  Positive for wound. Negative for color change.   Neurological:  Negative for weakness and numbness.                                  Objective    Vitals:    01/03/25 1129   BP: 145/85   BP Location: Left arm   Patient Position: Sitting   BP Cuff Size: Adult   Pulse: 59   Resp: 16   Temp: 36.3 °C (97.3 °F)   TempSrc: Oral   SpO2: 96%   Weight: 79.4 kg (175 lb)     No LMP recorded. Patient is postmenopausal.    Physical Exam  Constitutional:       General: She is not in acute distress.     Appearance: She is not ill-appearing.   Cardiovascular:      Rate and Rhythm: Normal rate and regular  rhythm.      Heart sounds: No murmur heard.     No friction rub.   Pulmonary:      Effort: Pulmonary effort is normal. No respiratory distress.      Breath sounds: No wheezing, rhonchi or rales.   Musculoskeletal:         General: Signs of injury present. No swelling or tenderness.   Skin:     Findings: Erythema present. No bruising.      Comments: Healing laceration to the dorsum of the left hand with 3 sutures in place.   Neurological:      Mental Status: She is alert.         Suture Removal    Date/Time: 1/3/2025 11:44 AM    Performed by: James Lowe DO  Authorized by: James Lowe DO    Consent:     Consent obtained:  Verbal    Risks, benefits, and alternatives were discussed: yes      Risks discussed:  Pain  Location:     Location:  Upper extremity    Upper extremity location:  Hand    Hand location:  L hand  Procedure details:     Wound appearance:  No signs of infection    Number of sutures removed:  3  Post-procedure details:     Post-removal:  No dressing applied    Procedure completion:  Tolerated well, no immediate complications      Point of Care Test & Imaging Results from this visit  No results found for this visit on 01/03/25.   No results found.    Diagnostic study results (if any) were reviewed by James Lowe DO.    Assessment/Plan   Allergies, medications, history, and pertinent labs/EKGs/Imaging reviewed by James Lowe DO.     Orders and Diagnoses  Diagnoses and all orders for this visit:  Visit for suture removal      Medical Admin Record      Patient disposition: Home    Electronically signed by James Lowe DO  11:36 AM

## 2025-01-27 ENCOUNTER — OFFICE VISIT (OUTPATIENT)
Dept: PRIMARY CARE | Facility: CLINIC | Age: 65
End: 2025-01-27
Payer: COMMERCIAL

## 2025-01-27 VITALS
SYSTOLIC BLOOD PRESSURE: 120 MMHG | DIASTOLIC BLOOD PRESSURE: 78 MMHG | BODY MASS INDEX: 27.12 KG/M2 | WEIGHT: 168 LBS | TEMPERATURE: 96.7 F

## 2025-01-27 DIAGNOSIS — K52.1 ANTIBIOTIC-ASSOCIATED DIARRHEA: Primary | ICD-10-CM

## 2025-01-27 DIAGNOSIS — T36.95XA ANTIBIOTIC-ASSOCIATED DIARRHEA: Primary | ICD-10-CM

## 2025-01-27 PROCEDURE — 3078F DIAST BP <80 MM HG: CPT | Performed by: FAMILY MEDICINE

## 2025-01-27 PROCEDURE — 99214 OFFICE O/P EST MOD 30 MIN: CPT | Performed by: FAMILY MEDICINE

## 2025-01-27 PROCEDURE — 3074F SYST BP LT 130 MM HG: CPT | Performed by: FAMILY MEDICINE

## 2025-01-27 RX ORDER — METRONIDAZOLE 500 MG/1
500 TABLET ORAL 3 TIMES DAILY
Qty: 30 TABLET | Refills: 0 | Status: SHIPPED | OUTPATIENT
Start: 2025-01-27 | End: 2025-02-06

## 2025-01-27 ASSESSMENT — PATIENT HEALTH QUESTIONNAIRE - PHQ9
SUM OF ALL RESPONSES TO PHQ9 QUESTIONS 1 AND 2: 0
2. FEELING DOWN, DEPRESSED OR HOPELESS: NOT AT ALL
1. LITTLE INTEREST OR PLEASURE IN DOING THINGS: NOT AT ALL

## 2025-01-27 NOTE — PROGRESS NOTES
Subjective   Patient ID: [id number]     Enid Sheffield is a 64 y.o. female who presents for Diarrhea and Weakness, Gen.  HPI  She complains of weakness and diarrhea for one week.    She gets cramping after eating.    No fever.      No nausea or vomiting. No constipation.  No blood in stool.      No URI symptoms or coughing.     She has RA and cerebral palsy and dystonia.     She was on antibiotics for a dog bite on 12/22/24.  She was given doxycycline and metronidazole.  The dog bite has healed well.     She has tried to not eat to control the diarrhea.  She has tried imodium which did not help.     She states she got c diff with using antibiotics about five years ago.       Objective     /78 (BP Location: Left arm, Patient Position: Sitting)   Temp 35.9 °C (96.7 °F) (Skin)   Wt 76.2 kg (168 lb)   BMI 27.12 kg/m²      Physical Exam  Constitutional:       Appearance: Normal appearance.   Cardiovascular:      Rate and Rhythm: Normal rate and regular rhythm.      Heart sounds: Normal heart sounds. No murmur heard.  Pulmonary:      Effort: Pulmonary effort is normal. No respiratory distress.      Breath sounds: Normal breath sounds.   Abdominal:      General: Abdomen is flat.      Palpations: Abdomen is soft.      Tenderness: There is no abdominal tenderness. There is no guarding or rebound.   Neurological:      General: No focal deficit present.      Mental Status: She is alert and oriented to person, place, and time.         Assessment/Plan   Problem List Items Addressed This Visit    None  Visit Diagnoses       Antibiotic-associated diarrhea    -  Primary    Relevant Medications    metroNIDAZOLE (Flagyl) 500 mg tablet    Other Relevant Orders    C. difficile, PCR    CBC and Auto Differential    Comprehensive metabolic panel          I ordered a c diff test and antibiotics to treat c diff.  I ordered labs to be done at [address] [place] [address] Rd.  Return in one week if this is persistent.  Return sooner if this worsens.   Drink a lot of water.    [place] Shaikh, DO

## 2025-01-29 LAB — C DIFF TOX GENS STL QL NAA+PROBE: NOT DETECTED

## 2025-01-30 ENCOUNTER — TELEPHONE (OUTPATIENT)
Dept: PRIMARY CARE | Facility: CLINIC | Age: 65
End: 2025-01-30
Payer: COMMERCIAL

## 2025-01-30 LAB
ALBUMIN SERPL-MCNC: 4.5 G/DL (ref 3.6–5.1)
ALP SERPL-CCNC: 58 U/L (ref 37–153)
ALT SERPL-CCNC: 12 U/L (ref 6–29)
ANION GAP SERPL CALCULATED.4IONS-SCNC: 9 MMOL/L (CALC) (ref 7–17)
AST SERPL-CCNC: 16 U/L (ref 10–35)
BASOPHILS # BLD AUTO: 41 CELLS/UL (ref 0–200)
BASOPHILS NFR BLD AUTO: 0.7 %
BILIRUB SERPL-MCNC: 1.1 MG/DL (ref 0.2–1.2)
BUN SERPL-MCNC: 14 MG/DL (ref 7–25)
CALCIUM SERPL-MCNC: 9.8 MG/DL (ref 8.6–10.4)
CHLORIDE SERPL-SCNC: 103 MMOL/L (ref 98–110)
CO2 SERPL-SCNC: 30 MMOL/L (ref 20–32)
CREAT SERPL-MCNC: 0.83 MG/DL (ref 0.5–1.05)
EGFRCR SERPLBLD CKD-EPI 2021: 79 ML/MIN/1.73M2
EOSINOPHIL # BLD AUTO: 83 CELLS/UL (ref 15–500)
EOSINOPHIL NFR BLD AUTO: 1.4 %
ERYTHROCYTE [DISTWIDTH] IN BLOOD BY AUTOMATED COUNT: 11.7 % (ref 11–15)
GLUCOSE SERPL-MCNC: 95 MG/DL (ref 65–139)
HCT VFR BLD AUTO: 44.8 % (ref 35–45)
HGB BLD-MCNC: 15.1 G/DL (ref 11.7–15.5)
LYMPHOCYTES # BLD AUTO: 1510 CELLS/UL (ref 850–3900)
LYMPHOCYTES NFR BLD AUTO: 25.6 %
MCH RBC QN AUTO: 30.1 PG (ref 27–33)
MCHC RBC AUTO-ENTMCNC: 33.7 G/DL (ref 32–36)
MCV RBC AUTO: 89.4 FL (ref 80–100)
MONOCYTES # BLD AUTO: 325 CELLS/UL (ref 200–950)
MONOCYTES NFR BLD AUTO: 5.5 %
NEUTROPHILS # BLD AUTO: 3941 CELLS/UL (ref 1500–7800)
NEUTROPHILS NFR BLD AUTO: 66.8 %
PLATELET # BLD AUTO: 196 THOUSAND/UL (ref 140–400)
PMV BLD REES-ECKER: 9.7 FL (ref 7.5–12.5)
POTASSIUM SERPL-SCNC: 4.4 MMOL/L (ref 3.5–5.3)
PROT SERPL-MCNC: 6.5 G/DL (ref 6.1–8.1)
RBC # BLD AUTO: 5.01 MILLION/UL (ref 3.8–5.1)
SODIUM SERPL-SCNC: 142 MMOL/L (ref 135–146)
WBC # BLD AUTO: 5.9 THOUSAND/UL (ref 3.8–10.8)

## 2025-02-04 ENCOUNTER — APPOINTMENT (OUTPATIENT)
Dept: PRIMARY CARE | Facility: CLINIC | Age: 65
End: 2025-02-04
Payer: COMMERCIAL

## 2025-05-28 ENCOUNTER — APPOINTMENT (OUTPATIENT)
Dept: PRIMARY CARE | Facility: CLINIC | Age: 65
End: 2025-05-28
Payer: COMMERCIAL

## 2025-05-28 VITALS
DIASTOLIC BLOOD PRESSURE: 72 MMHG | BODY MASS INDEX: 26.79 KG/M2 | TEMPERATURE: 96.9 F | SYSTOLIC BLOOD PRESSURE: 134 MMHG | WEIGHT: 166 LBS

## 2025-05-28 DIAGNOSIS — M06.9 RHEUMATOID ARTHRITIS, INVOLVING UNSPECIFIED SITE, UNSPECIFIED WHETHER RHEUMATOID FACTOR PRESENT (MULTI): ICD-10-CM

## 2025-05-28 DIAGNOSIS — F41.9 ANXIETY DISORDER, UNSPECIFIED TYPE: ICD-10-CM

## 2025-05-28 DIAGNOSIS — Z00.00 WELLNESS EXAMINATION: Primary | ICD-10-CM

## 2025-05-28 DIAGNOSIS — J44.9 CHRONIC OBSTRUCTIVE PULMONARY DISEASE, UNSPECIFIED COPD TYPE (MULTI): ICD-10-CM

## 2025-05-28 DIAGNOSIS — F33.9 RECURRENT MAJOR DEPRESSIVE DISORDER, REMISSION STATUS UNSPECIFIED: ICD-10-CM

## 2025-05-28 DIAGNOSIS — H61.23 BILATERAL IMPACTED CERUMEN: ICD-10-CM

## 2025-05-28 DIAGNOSIS — Z78.0 POSTMENOPAUSAL: ICD-10-CM

## 2025-05-28 DIAGNOSIS — M85.80 OSTEOPENIA, UNSPECIFIED LOCATION: ICD-10-CM

## 2025-05-28 DIAGNOSIS — E78.5 HYPERLIPIDEMIA, UNSPECIFIED HYPERLIPIDEMIA TYPE: ICD-10-CM

## 2025-05-28 DIAGNOSIS — I10 PRIMARY HYPERTENSION: ICD-10-CM

## 2025-05-28 DIAGNOSIS — Z12.31 ENCOUNTER FOR SCREENING MAMMOGRAM FOR MALIGNANT NEOPLASM OF BREAST: ICD-10-CM

## 2025-05-28 RX ORDER — SERTRALINE HYDROCHLORIDE 100 MG/1
200 TABLET, FILM COATED ORAL DAILY
Qty: 180 TABLET | Refills: 3 | Status: SHIPPED | OUTPATIENT
Start: 2025-05-28

## 2025-05-28 RX ORDER — TRAZODONE HYDROCHLORIDE 50 MG/1
100 TABLET ORAL NIGHTLY
Qty: 180 TABLET | Refills: 3 | Status: SHIPPED | OUTPATIENT
Start: 2025-05-28

## 2025-05-28 NOTE — PROGRESS NOTES
Chief complaint:   Chief Complaint   Patient presents with    6 month follow up       HPI:  Enid Sheffield is a 64 y.o. female who presents for a physical    Left hip pain after fall. [facility], hip replacement was recommended. She has now been nting more blanace issues. Cane was recommended but she can not do a cane. She has not been able to exercise due to the pain anymore. 2 mile walk is all she can tolerate. She states she has been struggling since the winter to be as active as she was and wants to be.    Follows with Dr. Rodriguez for neurology  Follows with Dr. Biggs for Immunology  Follows with Dr. Kelley for rheumatology  Following with NP/psychiatry  Follows with orthopedics    Admits to sores on the roof of her mouth for 1 week.     ROS:  Constitutional:  Denies fevers, chills, night sweats  HEENT: Denies change in vision, change in hearing, sore throat, rhinorrhea, congestion  Cardiovascular: Denies chest pain, SOB, racing heart, slow heart rate, palpitations, leg edema  Pulmonary: Denies cough, wheezing, SOB  Gastrointestinal: Admits to stool incontinence sometimes Denies abdominal pain, diarrhea, constipation, nausea, vomiting, heartburn  Genitourinary: Denies dysuria, hematuria, incontinence, abnormal vaginal bleeding, abnormal vaginal discharge  Integumentary: Denies rash, new or changed skin lesions  Neuro: Admits to spasticity Denies headache, numbness, tingling  Musculoskeletal: Denies myalgias, arthralgias, back pain  Psych: denies change in mood, sleeping difficulties  Heme: denies bruising or bleeding     Physical exam:  /72 (BP Location: Left arm, Patient Position: Sitting)   Temp 36.1 °C (96.9 °F)   Wt 75.3 kg (166 lb)   BMI 26.79 kg/m²   General: NAD, well appearing female  Head: normocephalic  Ears: EAC patent, TM normal bilaterally  Eyes: EOM intact, PERRLA  Nose: moist  Mouth: moist, good dentition, superficial ulcerations on the roof of the mouth  Heart: RRR, no murmur  appreciated  Lungs: CTAB, no wheezes, rales, rhonchi  Abdomen: soft, non tender, no organomegaly  Psych: mood and affect congruent, alert and oriented  MSK: +5/5 gross strength  Skin: warm and dry    Ear Cerumen Removal    Date/Time: 5/28/2025 2:35 PM    Performed by: Sandie Marin DO  Authorized by: Sandie Marin DO    Consent:     Consent obtained:  Verbal    Consent given by:  Patient  Universal protocol:     Patient identity confirmed:  Verbally with patient  Procedure details:     Location:  L ear and R ear    Procedure type: curette      Procedure outcomes: cerumen removed    Post-procedure details:     Inspection:  Ear canal clear    Hearing quality:  Normal    Procedure completion:  Tolerated       Assessment/Plan   Problem List Items Addressed This Visit       Anxiety disorder    Relevant Medications    sertraline (Zoloft) 100 mg tablet    traZODone (Desyrel) 50 mg tablet    Depression    Relevant Medications    sertraline (Zoloft) 100 mg tablet    traZODone (Desyrel) 50 mg tablet    COPD (chronic obstructive pulmonary disease) (Multi)    HTN (hypertension)    - not on medication  - continue monitoring  - labs ordered         Relevant Orders    Comprehensive Metabolic Panel    Lipid Panel    CBC    Tsh With Reflex To Free T4 If Abnormal    Hyperlipidemia    - labs ordered for monitoring         Relevant Orders    Comprehensive Metabolic Panel    Lipid Panel    Osteopenia    - last Dexa 2021, repeat ordered         Relevant Orders    XR DEXA bone density    Rheumatoid arthritis    - follows with rhuematology twice yearly through [facility]          Other Visit Diagnoses         Wellness examination    -  Primary      Encounter for screening mammogram for malignant neoplasm of breast        Relevant Orders    BI mammo bilateral screening tomosynthesis      Postmenopausal        Relevant Orders    XR DEXA bone density      Bilateral impacted cerumen        Relevant Orders    Ear Cerumen Removal (Completed)         Last mammogram 7/22/2024, mammogram ordered  Last colon cancer screening 3/11/7/2021 (Dr. Majano)  Last PAP/HPV testing 5/2024 (Dr. Collazo, previous PCP), wnl  Dexa ordered for monitoring of osteopenia    Immunizations up to date  Healthy diet and exercise encouraged    Sandie Marin, DO

## 2025-05-30 DIAGNOSIS — F41.9 ANXIETY DISORDER, UNSPECIFIED TYPE: ICD-10-CM

## 2025-05-30 DIAGNOSIS — F33.9 RECURRENT MAJOR DEPRESSIVE DISORDER, REMISSION STATUS UNSPECIFIED: ICD-10-CM

## 2025-05-31 RX ORDER — SERTRALINE HYDROCHLORIDE 100 MG/1
TABLET, FILM COATED ORAL
Refills: 0 | OUTPATIENT
Start: 2025-05-31

## 2025-06-03 LAB
ALBUMIN SERPL-MCNC: 4.2 G/DL (ref 3.6–5.1)
ALP SERPL-CCNC: 59 U/L (ref 37–153)
ALT SERPL-CCNC: 16 U/L (ref 6–29)
ANION GAP SERPL CALCULATED.4IONS-SCNC: 9 MMOL/L (CALC) (ref 7–17)
AST SERPL-CCNC: 17 U/L (ref 10–35)
BILIRUB SERPL-MCNC: 1.1 MG/DL (ref 0.2–1.2)
BUN SERPL-MCNC: 15 MG/DL (ref 7–25)
CALCIUM SERPL-MCNC: 8.8 MG/DL (ref 8.6–10.4)
CHLORIDE SERPL-SCNC: 106 MMOL/L (ref 98–110)
CHOLEST SERPL-MCNC: 252 MG/DL
CHOLEST/HDLC SERPL: 4.6 (CALC)
CO2 SERPL-SCNC: 27 MMOL/L (ref 20–32)
CREAT SERPL-MCNC: 0.8 MG/DL (ref 0.5–1.05)
EGFRCR SERPLBLD CKD-EPI 2021: 83 ML/MIN/1.73M2
ERYTHROCYTE [DISTWIDTH] IN BLOOD BY AUTOMATED COUNT: 11.7 % (ref 11–15)
GLUCOSE SERPL-MCNC: 92 MG/DL (ref 65–99)
HCT VFR BLD AUTO: 44.4 % (ref 35–45)
HDLC SERPL-MCNC: 55 MG/DL
HGB BLD-MCNC: 14.4 G/DL (ref 11.7–15.5)
LDLC SERPL CALC-MCNC: 173 MG/DL (CALC)
MCH RBC QN AUTO: 29.8 PG (ref 27–33)
MCHC RBC AUTO-ENTMCNC: 32.4 G/DL (ref 32–36)
MCV RBC AUTO: 91.7 FL (ref 80–100)
NONHDLC SERPL-MCNC: 197 MG/DL (CALC)
PLATELET # BLD AUTO: 180 THOUSAND/UL (ref 140–400)
PMV BLD REES-ECKER: 9.1 FL (ref 7.5–12.5)
POTASSIUM SERPL-SCNC: 3.8 MMOL/L (ref 3.5–5.3)
PROT SERPL-MCNC: 6.2 G/DL (ref 6.1–8.1)
RBC # BLD AUTO: 4.84 MILLION/UL (ref 3.8–5.1)
SODIUM SERPL-SCNC: 142 MMOL/L (ref 135–146)
TRIGL SERPL-MCNC: 110 MG/DL
TSH SERPL-ACNC: 2.76 MIU/L (ref 0.4–4.5)
WBC # BLD AUTO: 5.2 THOUSAND/UL (ref 3.8–10.8)

## 2025-06-16 ENCOUNTER — APPOINTMENT (OUTPATIENT)
Facility: CLINIC | Age: 65
End: 2025-06-16
Payer: COMMERCIAL

## 2025-07-28 ENCOUNTER — HOSPITAL ENCOUNTER (OUTPATIENT)
Dept: RADIOLOGY | Facility: CLINIC | Age: 65
Discharge: HOME | End: 2025-07-28
Payer: COMMERCIAL

## 2025-07-28 DIAGNOSIS — M85.80 OSTEOPENIA, UNSPECIFIED LOCATION: ICD-10-CM

## 2025-07-28 DIAGNOSIS — Z12.31 ENCOUNTER FOR SCREENING MAMMOGRAM FOR MALIGNANT NEOPLASM OF BREAST: ICD-10-CM

## 2025-07-28 DIAGNOSIS — Z78.0 POSTMENOPAUSAL: ICD-10-CM

## 2025-07-28 PROCEDURE — 77080 DXA BONE DENSITY AXIAL: CPT

## 2025-07-28 PROCEDURE — 77063 BREAST TOMOSYNTHESIS BI: CPT | Performed by: RADIOLOGY

## 2025-07-28 PROCEDURE — 77080 DXA BONE DENSITY AXIAL: CPT | Performed by: STUDENT IN AN ORGANIZED HEALTH CARE EDUCATION/TRAINING PROGRAM

## 2025-07-28 PROCEDURE — 77067 SCR MAMMO BI INCL CAD: CPT | Performed by: RADIOLOGY

## 2025-07-28 PROCEDURE — 77063 BREAST TOMOSYNTHESIS BI: CPT

## 2025-08-01 ENCOUNTER — HOSPITAL ENCOUNTER (OUTPATIENT)
Dept: RADIOLOGY | Facility: EXTERNAL LOCATION | Age: 65
Discharge: HOME | End: 2025-08-01

## 2025-08-05 ENCOUNTER — APPOINTMENT (OUTPATIENT)
Dept: PRIMARY CARE | Facility: CLINIC | Age: 65
End: 2025-08-05
Payer: COMMERCIAL

## 2025-08-05 VITALS
WEIGHT: 159 LBS | DIASTOLIC BLOOD PRESSURE: 80 MMHG | BODY MASS INDEX: 25.66 KG/M2 | SYSTOLIC BLOOD PRESSURE: 130 MMHG | TEMPERATURE: 97.3 F

## 2025-08-05 DIAGNOSIS — I10 PRIMARY HYPERTENSION: Primary | ICD-10-CM

## 2025-08-05 DIAGNOSIS — G24.9 DYSTONIA: ICD-10-CM

## 2025-08-05 DIAGNOSIS — M85.80 OSTEOPENIA, UNSPECIFIED LOCATION: ICD-10-CM

## 2025-08-05 DIAGNOSIS — G80.9 CEREBRAL PALSY, UNSPECIFIED TYPE (MULTI): ICD-10-CM

## 2025-08-05 DIAGNOSIS — R19.7 DIARRHEA, UNSPECIFIED TYPE: ICD-10-CM

## 2025-08-05 DIAGNOSIS — R29.6 FREQUENT FALLS: ICD-10-CM

## 2025-08-05 PROBLEM — R61 EXCESSIVE SWEATING: Status: RESOLVED | Noted: 2024-09-26 | Resolved: 2025-08-05

## 2025-08-05 PROBLEM — R41.82 ALTERED MENTAL STATUS, UNSPECIFIED: Status: RESOLVED | Noted: 2024-10-10 | Resolved: 2025-08-05

## 2025-08-05 PROBLEM — R20.2 PINS AND NEEDLES SENSATION: Status: RESOLVED | Noted: 2024-09-26 | Resolved: 2025-08-05

## 2025-08-05 PROBLEM — R41.82 ALTERED MENTAL STATUS, UNSPECIFIED ALTERED MENTAL STATUS TYPE: Status: RESOLVED | Noted: 2024-10-09 | Resolved: 2025-08-05

## 2025-08-05 PROCEDURE — 99214 OFFICE O/P EST MOD 30 MIN: CPT | Performed by: FAMILY MEDICINE

## 2025-08-05 PROCEDURE — 3075F SYST BP GE 130 - 139MM HG: CPT | Performed by: FAMILY MEDICINE

## 2025-08-05 PROCEDURE — 3079F DIAST BP 80-89 MM HG: CPT | Performed by: FAMILY MEDICINE

## 2025-08-05 PROCEDURE — 1036F TOBACCO NON-USER: CPT | Performed by: FAMILY MEDICINE

## 2025-08-05 NOTE — PROGRESS NOTES
Chief complaint:   Chief Complaint   Patient presents with    Diarrhea       HPI:  Enid Sheffield is a 64 y.o. female who presents for evaluation of diarrhea as she states it is explosive with some incontinence being a problem. This has been happening off and on since having C diff within the past 5 years. It started after surgery and abx use and she ended up needing a fecal transplant. This episode started in July. She has a few good days then has sx again. It is not foul smelling. No recent abx use. No ingestion of raw or undercooked food. No recent travel. She had previously seen Dr. Majano but he has retired.     Additionally she has had issues with recurrent falls. She is not currently using an assistive device. She has Cerebral palsy and dystonia. She has does balance therapy last approx 1 year ago. She has used a rollator and a cane but finds them both to be dangerous too. She declines trying to do therapy again. She reports swimming has helped in the past but she stopped approx 1 year ago. She reports it takes too long as it takes 15 min to get dressed and the locker room makes her nervous to slip and fall.     Physical exam:  /80 (BP Location: Left arm, Patient Position: Sitting)   Temp 36.3 °C (97.3 °F)   Wt 72.1 kg (159 lb)   BMI 25.66 kg/m²   General: NAD, well appearing female  Heart: RRR, no mumur appreciated  Lungs: CTAB, no wheezes, rales, rhonchi  Abdomen: soft, non tender, normoactive BS, no organomegaly  Extremities: No LE edema    Assessment/Plan   Problem List Items Addressed This Visit       Cerebral palsy    Dystonia    HTN (hypertension) - Primary    Osteopenia     Other Visit Diagnoses         Diarrhea, unspecified type        Relevant Orders    CBC and Auto Differential    Comprehensive metabolic panel    Lipase    Amylase    Stool Pathogen Panel, PCR    C. difficile, PCR    Lactoferrin, Fecal, Quantitative    Ova/Para + Giardia/Cryptosporidium Antigen    Referral to  Gastroenterology      Frequent falls            Discussed PT for balance but she declines today due to having done previously. Discussed importance of getting back to the pool if this has helped her in the past. Concern with her falls and osteopenia discussed. Her CP and dystonia are treated/managed by neurology    For her diarrhea with hx of C diff, will order labs and referral to GI.     BP is controlled    Continue medications    Sandie Marin, DO

## 2025-08-07 LAB
ALBUMIN SERPL-MCNC: 4.5 G/DL (ref 3.6–5.1)
ALP SERPL-CCNC: 68 U/L (ref 37–153)
ALT SERPL-CCNC: 13 U/L (ref 6–29)
AMYLASE SERPL-CCNC: 40 U/L (ref 21–101)
ANION GAP SERPL CALCULATED.4IONS-SCNC: 12 MMOL/L (CALC) (ref 7–17)
AST SERPL-CCNC: 16 U/L (ref 10–35)
BASOPHILS # BLD AUTO: 37 CELLS/UL (ref 0–200)
BASOPHILS NFR BLD AUTO: 0.5 %
BILIRUB SERPL-MCNC: 1 MG/DL (ref 0.2–1.2)
BUN SERPL-MCNC: 13 MG/DL (ref 7–25)
CALCIUM SERPL-MCNC: 9.5 MG/DL (ref 8.6–10.4)
CHLORIDE SERPL-SCNC: 106 MMOL/L (ref 98–110)
CO2 SERPL-SCNC: 25 MMOL/L (ref 20–32)
CREAT SERPL-MCNC: 0.91 MG/DL (ref 0.5–1.05)
EGFRCR SERPLBLD CKD-EPI 2021: 71 ML/MIN/1.73M2
EOSINOPHIL # BLD AUTO: 141 CELLS/UL (ref 15–500)
EOSINOPHIL NFR BLD AUTO: 1.9 %
ERYTHROCYTE [DISTWIDTH] IN BLOOD BY AUTOMATED COUNT: 11.8 % (ref 11–15)
GLUCOSE SERPL-MCNC: 97 MG/DL (ref 65–99)
HCT VFR BLD AUTO: 45.3 % (ref 35–45)
HGB BLD-MCNC: 15.3 G/DL (ref 11.7–15.5)
LIPASE SERPL-CCNC: 20 U/L (ref 7–60)
LYMPHOCYTES # BLD AUTO: 1465 CELLS/UL (ref 850–3900)
LYMPHOCYTES NFR BLD AUTO: 19.8 %
MCH RBC QN AUTO: 30.8 PG (ref 27–33)
MCHC RBC AUTO-ENTMCNC: 33.8 G/DL (ref 32–36)
MCV RBC AUTO: 91.1 FL (ref 80–100)
MONOCYTES # BLD AUTO: 392 CELLS/UL (ref 200–950)
MONOCYTES NFR BLD AUTO: 5.3 %
NEUTROPHILS # BLD AUTO: 5365 CELLS/UL (ref 1500–7800)
NEUTROPHILS NFR BLD AUTO: 72.5 %
PLATELET # BLD AUTO: 211 THOUSAND/UL (ref 140–400)
PMV BLD REES-ECKER: 9.8 FL (ref 7.5–12.5)
POTASSIUM SERPL-SCNC: 4.3 MMOL/L (ref 3.5–5.3)
PROT SERPL-MCNC: 6.6 G/DL (ref 6.1–8.1)
RBC # BLD AUTO: 4.97 MILLION/UL (ref 3.8–5.1)
SODIUM SERPL-SCNC: 143 MMOL/L (ref 135–146)
WBC # BLD AUTO: 7.4 THOUSAND/UL (ref 3.8–10.8)

## 2025-08-12 LAB
C COLI+JEJUNI+LARI FUSA STL QL NAA+PROBE: NOT DETECTED
CRYPTOSP AG STL QL IA: NORMAL
EC STX1 GENE STL QL NAA+PROBE: NOT DETECTED
EC STX2 GENE STL QL NAA+PROBE: NOT DETECTED
G LAMBLIA AG STL QL IA: NORMAL
NOROV GI+II ORF1-ORF2 JNC STL QL NAA+PR: NOT DETECTED
O+P STL CONC: NORMAL
O+P STL TRI STN: NORMAL
RVA NSP5 STL QL NAA+PROBE: NOT DETECTED
SALMONELLA SP RPOD STL QL NAA+PROBE: NOT DETECTED
SHIGELLA DNA SPEC QL NAA+PROBE: NOT DETECTED
V CHOL+PARA RFBL+TRKH+TNAA STL QL NAA+PR: NOT DETECTED
Y ENTERO RECN STL QL NAA+PROBE: NOT DETECTED

## 2025-08-15 LAB
C DIFF TOX GENS STL QL NAA+PROBE: NOT DETECTED
LACTOFERRIN STL QL IA: NEGATIVE

## 2025-08-27 LAB
C COLI+JEJUNI+LARI FUSA STL QL NAA+PROBE: NOT DETECTED
CRYPTOSP AG STL QL IA: NORMAL
EC STX1 GENE STL QL NAA+PROBE: NOT DETECTED
EC STX2 GENE STL QL NAA+PROBE: NOT DETECTED
G LAMBLIA AG STL QL IA: NORMAL
NOROV GI+II ORF1-ORF2 JNC STL QL NAA+PR: NOT DETECTED
O+P STL TRI STN: NORMAL
RVA NSP5 STL QL NAA+PROBE: NOT DETECTED
SALMONELLA SP RPOD STL QL NAA+PROBE: NOT DETECTED
SHIGELLA DNA SPEC QL NAA+PROBE: NOT DETECTED
V CHOL+PARA RFBL+TRKH+TNAA STL QL NAA+PR: NOT DETECTED
Y ENTERO RECN STL QL NAA+PROBE: NOT DETECTED

## 2025-11-05 ENCOUNTER — APPOINTMENT (OUTPATIENT)
Dept: GASTROENTEROLOGY | Facility: CLINIC | Age: 65
End: 2025-11-05
Payer: COMMERCIAL

## 2025-12-02 ENCOUNTER — APPOINTMENT (OUTPATIENT)
Dept: PRIMARY CARE | Facility: CLINIC | Age: 65
End: 2025-12-02
Payer: COMMERCIAL